# Patient Record
Sex: FEMALE | Race: BLACK OR AFRICAN AMERICAN | NOT HISPANIC OR LATINO | URBAN - METROPOLITAN AREA
[De-identification: names, ages, dates, MRNs, and addresses within clinical notes are randomized per-mention and may not be internally consistent; named-entity substitution may affect disease eponyms.]

---

## 2017-03-16 ENCOUNTER — EMERGENCY (EMERGENCY)
Facility: HOSPITAL | Age: 82
LOS: 1 days | Discharge: HOME | End: 2017-03-16
Attending: EMERGENCY MEDICINE | Admitting: EMERGENCY MEDICINE
Payer: COMMERCIAL

## 2017-03-16 VITALS
RESPIRATION RATE: 18 BRPM | HEART RATE: 61 BPM | SYSTOLIC BLOOD PRESSURE: 182 MMHG | DIASTOLIC BLOOD PRESSURE: 90 MMHG | TEMPERATURE: 98 F

## 2017-03-16 DIAGNOSIS — Z87.19 PERSONAL HISTORY OF OTHER DISEASES OF THE DIGESTIVE SYSTEM: Chronic | ICD-10-CM

## 2017-03-16 DIAGNOSIS — Z95.5 PRESENCE OF CORONARY ANGIOPLASTY IMPLANT AND GRAFT: ICD-10-CM

## 2017-03-16 DIAGNOSIS — I25.10 ATHEROSCLEROTIC HEART DISEASE OF NATIVE CORONARY ARTERY WITHOUT ANGINA PECTORIS: ICD-10-CM

## 2017-03-16 DIAGNOSIS — E78.00 PURE HYPERCHOLESTEROLEMIA, UNSPECIFIED: ICD-10-CM

## 2017-03-16 DIAGNOSIS — R05 COUGH: ICD-10-CM

## 2017-03-16 DIAGNOSIS — I10 ESSENTIAL (PRIMARY) HYPERTENSION: ICD-10-CM

## 2017-03-16 DIAGNOSIS — Z98.890 OTHER SPECIFIED POSTPROCEDURAL STATES: ICD-10-CM

## 2017-03-16 DIAGNOSIS — R07.89 OTHER CHEST PAIN: ICD-10-CM

## 2017-03-16 LAB
ALBUMIN SERPL ELPH-MCNC: 4.2 G/DL — SIGNIFICANT CHANGE UP (ref 3.3–5)
ALP SERPL-CCNC: 163 U/L — HIGH (ref 40–120)
ALT FLD-CCNC: 17 U/L RC — SIGNIFICANT CHANGE UP (ref 10–45)
ANION GAP SERPL CALC-SCNC: 18 MMOL/L — HIGH (ref 5–17)
AST SERPL-CCNC: 20 U/L — SIGNIFICANT CHANGE UP (ref 10–40)
BASOPHILS # BLD AUTO: 0 K/UL — SIGNIFICANT CHANGE UP (ref 0–0.2)
BASOPHILS NFR BLD AUTO: 0.1 % — SIGNIFICANT CHANGE UP (ref 0–2)
BILIRUB SERPL-MCNC: 0.5 MG/DL — SIGNIFICANT CHANGE UP (ref 0.2–1.2)
BUN SERPL-MCNC: 21 MG/DL — SIGNIFICANT CHANGE UP (ref 7–23)
CALCIUM SERPL-MCNC: 9.5 MG/DL — SIGNIFICANT CHANGE UP (ref 8.4–10.5)
CHLORIDE SERPL-SCNC: 100 MMOL/L — SIGNIFICANT CHANGE UP (ref 96–108)
CO2 SERPL-SCNC: 21 MMOL/L — LOW (ref 22–31)
CREAT SERPL-MCNC: 1.55 MG/DL — HIGH (ref 0.5–1.3)
EOSINOPHIL # BLD AUTO: 0.1 K/UL — SIGNIFICANT CHANGE UP (ref 0–0.5)
EOSINOPHIL NFR BLD AUTO: 2.2 % — SIGNIFICANT CHANGE UP (ref 0–6)
GLUCOSE SERPL-MCNC: 102 MG/DL — HIGH (ref 70–99)
HCT VFR BLD CALC: 43.5 % — SIGNIFICANT CHANGE UP (ref 34.5–45)
HGB BLD-MCNC: 15.2 G/DL — SIGNIFICANT CHANGE UP (ref 11.5–15.5)
LYMPHOCYTES # BLD AUTO: 1.4 K/UL — SIGNIFICANT CHANGE UP (ref 1–3.3)
LYMPHOCYTES # BLD AUTO: 27.8 % — SIGNIFICANT CHANGE UP (ref 13–44)
MCHC RBC-ENTMCNC: 29.9 PG — SIGNIFICANT CHANGE UP (ref 27–34)
MCHC RBC-ENTMCNC: 35 GM/DL — SIGNIFICANT CHANGE UP (ref 32–36)
MCV RBC AUTO: 85.4 FL — SIGNIFICANT CHANGE UP (ref 80–100)
MONOCYTES # BLD AUTO: 0.9 K/UL — SIGNIFICANT CHANGE UP (ref 0–0.9)
MONOCYTES NFR BLD AUTO: 17 % — HIGH (ref 2–14)
NEUTROPHILS # BLD AUTO: 2.7 K/UL — SIGNIFICANT CHANGE UP (ref 1.8–7.4)
NEUTROPHILS NFR BLD AUTO: 52.8 % — SIGNIFICANT CHANGE UP (ref 43–77)
PLATELET # BLD AUTO: 125 K/UL — LOW (ref 150–400)
POTASSIUM SERPL-MCNC: 4.3 MMOL/L — SIGNIFICANT CHANGE UP (ref 3.5–5.3)
POTASSIUM SERPL-SCNC: 4.3 MMOL/L — SIGNIFICANT CHANGE UP (ref 3.5–5.3)
PROT SERPL-MCNC: 7.8 G/DL — SIGNIFICANT CHANGE UP (ref 6–8.3)
RBC # BLD: 5.1 M/UL — SIGNIFICANT CHANGE UP (ref 3.8–5.2)
RBC # FLD: 14 % — SIGNIFICANT CHANGE UP (ref 10.3–14.5)
SODIUM SERPL-SCNC: 139 MMOL/L — SIGNIFICANT CHANGE UP (ref 135–145)
TROPONIN T SERPL-MCNC: <0.01 NG/ML — SIGNIFICANT CHANGE UP (ref 0–0.06)
WBC # BLD: 5.1 K/UL — SIGNIFICANT CHANGE UP (ref 3.8–10.5)
WBC # FLD AUTO: 5.1 K/UL — SIGNIFICANT CHANGE UP (ref 3.8–10.5)

## 2017-03-16 PROCEDURE — 93010 ELECTROCARDIOGRAM REPORT: CPT

## 2017-03-16 PROCEDURE — 71020: CPT | Mod: 26

## 2017-03-16 PROCEDURE — 99220: CPT | Mod: 25

## 2017-03-16 RX ORDER — ASPIRIN/CALCIUM CARB/MAGNESIUM 324 MG
81 TABLET ORAL DAILY
Qty: 0 | Refills: 0 | Status: DISCONTINUED | OUTPATIENT
Start: 2017-03-16 | End: 2017-03-20

## 2017-03-16 RX ORDER — ISOSORBIDE MONONITRATE 60 MG/1
30 TABLET, EXTENDED RELEASE ORAL DAILY
Qty: 0 | Refills: 0 | Status: DISCONTINUED | OUTPATIENT
Start: 2017-03-16 | End: 2017-03-20

## 2017-03-16 RX ORDER — ASPIRIN/CALCIUM CARB/MAGNESIUM 324 MG
325 TABLET ORAL ONCE
Qty: 0 | Refills: 0 | Status: COMPLETED | OUTPATIENT
Start: 2017-03-16 | End: 2017-03-16

## 2017-03-16 RX ORDER — FUROSEMIDE 40 MG
20 TABLET ORAL DAILY
Qty: 0 | Refills: 0 | Status: DISCONTINUED | OUTPATIENT
Start: 2017-03-16 | End: 2017-03-20

## 2017-03-16 RX ORDER — ATORVASTATIN CALCIUM 80 MG/1
80 TABLET, FILM COATED ORAL AT BEDTIME
Qty: 0 | Refills: 0 | Status: DISCONTINUED | OUTPATIENT
Start: 2017-03-16 | End: 2017-03-20

## 2017-03-16 RX ORDER — NIFEDIPINE 30 MG
90 TABLET, EXTENDED RELEASE 24 HR ORAL DAILY
Qty: 0 | Refills: 0 | Status: DISCONTINUED | OUTPATIENT
Start: 2017-03-16 | End: 2017-03-20

## 2017-03-16 RX ORDER — LOSARTAN POTASSIUM 100 MG/1
50 TABLET, FILM COATED ORAL DAILY
Qty: 0 | Refills: 0 | Status: DISCONTINUED | OUTPATIENT
Start: 2017-03-16 | End: 2017-03-20

## 2017-03-16 RX ORDER — SODIUM CHLORIDE 9 MG/ML
3 INJECTION INTRAMUSCULAR; INTRAVENOUS; SUBCUTANEOUS ONCE
Qty: 0 | Refills: 0 | Status: COMPLETED | OUTPATIENT
Start: 2017-03-16 | End: 2017-03-16

## 2017-03-16 RX ADMIN — SODIUM CHLORIDE 3 MILLILITER(S): 9 INJECTION INTRAMUSCULAR; INTRAVENOUS; SUBCUTANEOUS at 17:16

## 2017-03-16 RX ADMIN — Medication 325 MILLIGRAM(S): at 17:26

## 2017-03-16 NOTE — ED PROVIDER NOTE - NS ED MD SCRIBE ATTENDING SCRIBE SECTIONS
INTAKE ASSESSMENT/SCREENINGS/PHYSICAL EXAM/REVIEW OF SYSTEMS/VITAL SIGNS( Pullset)/HIV/HISTORY OF PRESENT ILLNESS/DISPOSITION/PAST MEDICAL/SURGICAL/SOCIAL HISTORY

## 2017-03-16 NOTE — ED CDU PROVIDER NOTE - DETAILS
CHEST PAIN  -[facility]  [facility]  -[facility] EVAL  -STRESS TEST  -CASE D/W ATTENDING MANAV

## 2017-03-16 NOTE — ED CDU PROVIDER NOTE - OBJECTIVE STATEMENT
83 year old female with PMHx of HTN, HLD, CAD s/p stent 2010 presents to ED c/o intermittent chest pain radiating to the left side of neck onset yesterday. Described as tightness. Occurs randomly even at rest. Worse with palpation. Admits to dry cough x 4 days. Denies fever/chills, SOB, heavy lifting, recent illness. F/u with cardiologist Dr. Forde on [facility]. Last stress test 1 year ago and normal. Will admit to CDU for tele and stress test.    Adv Care  PCP Zhane Greer

## 2017-03-16 NOTE — ED ADULT NURSE REASSESSMENT NOTE - NS ED NURSE REASSESS COMMENT FT1
pt arrived and oriented to CDU.  pt A&OX3.  VSS.  No acute distress. hx of stents in 2010. pt sinus adrienne in 50's on cardiac monitor.  pt reports having left sided intermediate CP since last night.  pt also reports palpations/dizziness/ left arm numbness and tingling with the CP.   pt denies any CP or other symptoms at this time.  pt pending CE#2 and EKG at 2300 followed by stress in am.  plan of care explained to patient and family.  Safety maintained.  Will cont to monitor.
Received pt from Gisela LICONA cdu, received pt alert and responsive, oriented x4, denies any respiratory distress, SOB, or difficulty breathing. Pt transferred to CDU for observation for chest pain , IV in place, patent and free of signs of infiltration, placed on continuos cardiac monitoring as ordered, Sinus Bradycardia HR in the 40's ,  pt denies chest pain or palpitations, V/S stable, pt afebrile, pt denies pain at this time. Pt educated on unit and unit rules, instructed patient to notify RN of any needed assistance, Pt verbalizes understanding, Call bell placed within reach. Safety maintained. Will continue to monitor.

## 2017-03-16 NOTE — ED CDU PROVIDER NOTE - PLAN OF CARE
1. Stay hydrated.  2. Continue Current Home Medications  3. Follow up with your PCP Dr. Greer and your Cardiologist Dr. Forde in 1-2 days (Bring printed results to your doctor visit).  4. Return if symptoms, worsen, fever, weakness, chest pain, difficulty breathing, dizziness and all other concerns. 1. Stay hydrated.  2. Continue Current Home Medications  3. Follow up with your PCP Dr. Greer and your Cardiologist Dr. Forde in 1-2 days and follow up elevated Creatinine level with your Doctor. (Bring printed results to your doctor visit).  4. Return if symptoms, worsen, fever, weakness, chest pain, difficulty breathing, dizziness and all other concerns. 1. Stay hydrated.  2. Continue Current Home Medications.  3. Follow up with your PCP Dr. Greer and your Cardiologist Dr. Forde in 1-2 days and follow up elevated Creatinine level with your Doctor. (Bring printed results to your doctor visit).  4. Return if symptoms worsen, fever, weakness, chest pain, difficulty breathing, dizziness and all other concerns.

## 2017-03-16 NOTE — ED ADULT NURSE NOTE - OBJECTIVE STATEMENT
83y female pt brought by daughter, hx of htn and stent on daily 81mg of Aspirin, arrived to ED c/o sternal chest pain radiating to left side of neck started yesterday. Pain in intermittent and no pain at rest currently, on CM with sinus adrienne and no sob, lungs are CTA. aox4, normally ambulating by herself without assist.

## 2017-03-16 NOTE — ED CDU PROVIDER NOTE - PROGRESS NOTE DETAILS
Spoke with Adv Care Dr. Levine. Will see patient in CDU in AM. - Giselle Reed PA-C CDU NOTE ATIF Hooker: pt resting comfortably, feels well without complaint. some neck discomfort. NAD VSS. no events on tele. ttp at L side paraspinal cervical. offerred pain medication- pt declined. offerred heat packs- pt accepted. CDU NOTE ATIF Hooker: pt asleep. NAD VSS. no events on tele. CDU NOTE ATIF Hooker: pt resting comfortably, feels well, neck pain improved with hot pack. requests nasal decongestant. NAD VSS. no events on tele. Flonase ordered. Patient resting in bed comfortably. NAD. C/o frontal HA. Also c/o chest soreness on palpation. No SOB. VSS. No events on telemetry monitor. Will give tylenol and re-assess. -Giselle Reed PA-C Patient resting in bed comfortably. NAD. No complaints. Vital Signs Stable. No events on telemetry monitor. Awaiting stress test results.  -Giselle Reed PA-C Patient resting in bed comfortably. NAD. No complaints. Vital Signs Stable. No events on telemetry monitor.  Normal stress test; unchanged from nuc stress in 2010. D/w Dr. Ware. -Giselle Reed PA-C ATTENDING MD Jeanie: Pt pain free and comfortable. Asymtomatic now. Eating comfortably. Stress test without acute findings. Repeat exam reassuring. Pt is stable for DC and will f/u with regular outpatient providers. All questions answered. Pt counselled on warning signs for which to return to the ED.

## 2017-03-16 NOTE — ED ADULT NURSE NOTE - PMH
Benign essential HTN    CAD (coronary artery disease)    Hypercholesteremia    Presence of stent in coronary artery

## 2017-03-16 NOTE — ED PROVIDER NOTE - DETAILS:
intermittent left sided chest pain to neck for days, worse today, clear lungs, bradycardic exam, soft abdomen

## 2017-03-16 NOTE — ED PROVIDER NOTE - OBJECTIVE STATEMENT
83 year old female with PMHx of cardiac stent and Hypertension  and presents to ED c/o chest pain radiating to the left side of neck onset yesterday. Pain is intermittent. F/u with cardiologist Dr. Forde on [facility].

## 2017-03-17 VITALS
TEMPERATURE: 98 F | HEART RATE: 57 BPM | OXYGEN SATURATION: 99 % | SYSTOLIC BLOOD PRESSURE: 112 MMHG | DIASTOLIC BLOOD PRESSURE: 73 MMHG | RESPIRATION RATE: 16 BRPM

## 2017-03-17 LAB — TROPONIN T SERPL-MCNC: <0.01 NG/ML — SIGNIFICANT CHANGE UP (ref 0–0.06)

## 2017-03-17 PROCEDURE — 71046 X-RAY EXAM CHEST 2 VIEWS: CPT

## 2017-03-17 PROCEDURE — 80053 COMPREHEN METABOLIC PANEL: CPT

## 2017-03-17 PROCEDURE — 84484 ASSAY OF TROPONIN QUANT: CPT

## 2017-03-17 PROCEDURE — A9505: CPT

## 2017-03-17 PROCEDURE — A9500: CPT

## 2017-03-17 PROCEDURE — 78452 HT MUSCLE IMAGE SPECT MULT: CPT | Mod: 26

## 2017-03-17 PROCEDURE — 99284 EMERGENCY DEPT VISIT MOD MDM: CPT | Mod: 25

## 2017-03-17 PROCEDURE — 93010 ELECTROCARDIOGRAM REPORT: CPT | Mod: 77

## 2017-03-17 PROCEDURE — 93016 CV STRESS TEST SUPVJ ONLY: CPT

## 2017-03-17 PROCEDURE — 93018 CV STRESS TEST I&R ONLY: CPT

## 2017-03-17 PROCEDURE — 93017 CV STRESS TEST TRACING ONLY: CPT

## 2017-03-17 PROCEDURE — 99217: CPT | Mod: 25

## 2017-03-17 PROCEDURE — 85027 COMPLETE CBC AUTOMATED: CPT

## 2017-03-17 PROCEDURE — G0378: CPT

## 2017-03-17 PROCEDURE — 78452 HT MUSCLE IMAGE SPECT MULT: CPT

## 2017-03-17 PROCEDURE — 93005 ELECTROCARDIOGRAM TRACING: CPT

## 2017-03-17 PROCEDURE — 94640 AIRWAY INHALATION TREATMENT: CPT

## 2017-03-17 RX ORDER — ACETAMINOPHEN 500 MG
650 TABLET ORAL ONCE
Qty: 0 | Refills: 0 | Status: COMPLETED | OUTPATIENT
Start: 2017-03-17 | End: 2017-03-17

## 2017-03-17 RX ORDER — FLUTICASONE PROPIONATE 50 MCG
1 SPRAY, SUSPENSION NASAL
Qty: 0 | Refills: 0 | Status: DISCONTINUED | OUTPATIENT
Start: 2017-03-17 | End: 2017-03-20

## 2017-03-17 RX ADMIN — Medication 650 MILLIGRAM(S): at 08:10

## 2017-03-17 RX ADMIN — Medication 1 SPRAY(S): at 05:31

## 2020-04-04 ENCOUNTER — INPATIENT (INPATIENT)
Facility: HOSPITAL | Age: 85
LOS: 3 days | Discharge: ANOTHER HOSPITAL | End: 2020-04-08
Attending: HOSPITALIST | Admitting: HOSPITALIST
Payer: COMMERCIAL

## 2020-04-04 VITALS
WEIGHT: 160.06 LBS | RESPIRATION RATE: 20 BRPM | OXYGEN SATURATION: 92 % | HEART RATE: 58 BPM | TEMPERATURE: 102 F | HEIGHT: 66 IN | SYSTOLIC BLOOD PRESSURE: 119 MMHG | DIASTOLIC BLOOD PRESSURE: 63 MMHG

## 2020-04-04 DIAGNOSIS — Z87.19 PERSONAL HISTORY OF OTHER DISEASES OF THE DIGESTIVE SYSTEM: Chronic | ICD-10-CM

## 2020-04-04 LAB
ALBUMIN SERPL ELPH-MCNC: 3.2 G/DL — LOW (ref 3.3–5)
ALP SERPL-CCNC: 91 U/L — SIGNIFICANT CHANGE UP (ref 40–120)
ALT FLD-CCNC: 25 U/L — SIGNIFICANT CHANGE UP (ref 12–78)
ANION GAP SERPL CALC-SCNC: 12 MMOL/L — SIGNIFICANT CHANGE UP (ref 5–17)
APPEARANCE UR: CLEAR — SIGNIFICANT CHANGE UP
APTT BLD: 31.6 SEC — SIGNIFICANT CHANGE UP (ref 28.5–37)
AST SERPL-CCNC: 53 U/L — HIGH (ref 15–37)
BASE EXCESS BLDA CALC-SCNC: 0.4 MMOL/L — SIGNIFICANT CHANGE UP (ref -2–2)
BASOPHILS # BLD AUTO: 0.02 K/UL — SIGNIFICANT CHANGE UP (ref 0–0.2)
BASOPHILS NFR BLD AUTO: 0.2 % — SIGNIFICANT CHANGE UP (ref 0–2)
BILIRUB SERPL-MCNC: 1 MG/DL — SIGNIFICANT CHANGE UP (ref 0.2–1.2)
BILIRUB UR-MCNC: NEGATIVE — SIGNIFICANT CHANGE UP
BLOOD GAS COMMENTS: SIGNIFICANT CHANGE UP
BLOOD GAS SOURCE: SIGNIFICANT CHANGE UP
BUN SERPL-MCNC: 41 MG/DL — HIGH (ref 7–23)
CALCIUM SERPL-MCNC: 9.1 MG/DL — SIGNIFICANT CHANGE UP (ref 8.5–10.1)
CHLORIDE SERPL-SCNC: 106 MMOL/L — SIGNIFICANT CHANGE UP (ref 96–108)
CO2 SERPL-SCNC: 25 MMOL/L — SIGNIFICANT CHANGE UP (ref 22–31)
COLOR SPEC: YELLOW — SIGNIFICANT CHANGE UP
CREAT SERPL-MCNC: 2.3 MG/DL — HIGH (ref 0.5–1.3)
DIFF PNL FLD: NEGATIVE — SIGNIFICANT CHANGE UP
EOSINOPHIL # BLD AUTO: 0 K/UL — SIGNIFICANT CHANGE UP (ref 0–0.5)
EOSINOPHIL NFR BLD AUTO: 0 % — SIGNIFICANT CHANGE UP (ref 0–6)
GLUCOSE SERPL-MCNC: 123 MG/DL — HIGH (ref 70–99)
GLUCOSE UR QL: NEGATIVE MG/DL — SIGNIFICANT CHANGE UP
HCO3 BLDA-SCNC: 22 MMOL/L — SIGNIFICANT CHANGE UP (ref 21–29)
HCT VFR BLD CALC: 45.9 % — HIGH (ref 34.5–45)
HGB BLD-MCNC: 15.5 G/DL — SIGNIFICANT CHANGE UP (ref 11.5–15.5)
HOROWITZ INDEX BLDA+IHG-RTO: 21 — SIGNIFICANT CHANGE UP
IMM GRANULOCYTES NFR BLD AUTO: 0.5 % — SIGNIFICANT CHANGE UP (ref 0–1.5)
INR BLD: 1.22 RATIO — HIGH (ref 0.88–1.16)
KETONES UR-MCNC: NEGATIVE — SIGNIFICANT CHANGE UP
LACTATE SERPL-SCNC: 2.3 MMOL/L — HIGH (ref 0.7–2)
LEUKOCYTE ESTERASE UR-ACNC: NEGATIVE — SIGNIFICANT CHANGE UP
LYMPHOCYTES # BLD AUTO: 0.77 K/UL — LOW (ref 1–3.3)
LYMPHOCYTES # BLD AUTO: 7.3 % — LOW (ref 13–44)
MCHC RBC-ENTMCNC: 26.5 PG — LOW (ref 27–34)
MCHC RBC-ENTMCNC: 33.8 GM/DL — SIGNIFICANT CHANGE UP (ref 32–36)
MCV RBC AUTO: 78.6 FL — LOW (ref 80–100)
MONOCYTES # BLD AUTO: 0.39 K/UL — SIGNIFICANT CHANGE UP (ref 0–0.9)
MONOCYTES NFR BLD AUTO: 3.7 % — SIGNIFICANT CHANGE UP (ref 2–14)
NEUTROPHILS # BLD AUTO: 9.31 K/UL — HIGH (ref 1.8–7.4)
NEUTROPHILS NFR BLD AUTO: 88.3 % — HIGH (ref 43–77)
NITRITE UR-MCNC: NEGATIVE — SIGNIFICANT CHANGE UP
NRBC # BLD: 0 /100 WBCS — SIGNIFICANT CHANGE UP (ref 0–0)
PCO2 BLDA: 28 MMHG — LOW (ref 32–46)
PH BLD: 7.51 — HIGH (ref 7.35–7.45)
PH UR: 5 — SIGNIFICANT CHANGE UP (ref 5–8)
PLATELET # BLD AUTO: 122 K/UL — LOW (ref 150–400)
PO2 BLDA: 74 MMHG — SIGNIFICANT CHANGE UP (ref 74–108)
POTASSIUM SERPL-MCNC: 3.9 MMOL/L — SIGNIFICANT CHANGE UP (ref 3.5–5.3)
POTASSIUM SERPL-SCNC: 3.9 MMOL/L — SIGNIFICANT CHANGE UP (ref 3.5–5.3)
PROT SERPL-MCNC: 8.7 GM/DL — HIGH (ref 6–8.3)
PROT UR-MCNC: 100 MG/DL
PROTHROM AB SERPL-ACNC: 13.8 SEC — HIGH (ref 10–12.9)
RBC # BLD: 5.84 M/UL — HIGH (ref 3.8–5.2)
RBC # FLD: 16.5 % — HIGH (ref 10.3–14.5)
SAO2 % BLDA: 95 % — SIGNIFICANT CHANGE UP (ref 92–96)
SODIUM SERPL-SCNC: 143 MMOL/L — SIGNIFICANT CHANGE UP (ref 135–145)
SP GR SPEC: 1.02 — SIGNIFICANT CHANGE UP (ref 1.01–1.02)
TROPONIN I SERPL-MCNC: 0.06 NG/ML — HIGH (ref 0.01–0.04)
UROBILINOGEN FLD QL: NEGATIVE MG/DL — SIGNIFICANT CHANGE UP
WBC # BLD: 10.54 K/UL — HIGH (ref 3.8–10.5)
WBC # FLD AUTO: 10.54 K/UL — HIGH (ref 3.8–10.5)

## 2020-04-04 PROCEDURE — 71045 X-RAY EXAM CHEST 1 VIEW: CPT | Mod: 26

## 2020-04-04 PROCEDURE — 93010 ELECTROCARDIOGRAM REPORT: CPT

## 2020-04-04 PROCEDURE — 99285 EMERGENCY DEPT VISIT HI MDM: CPT

## 2020-04-04 RX ORDER — SODIUM CHLORIDE 9 MG/ML
500 INJECTION INTRAMUSCULAR; INTRAVENOUS; SUBCUTANEOUS ONCE
Refills: 0 | Status: COMPLETED | OUTPATIENT
Start: 2020-04-04 | End: 2020-04-04

## 2020-04-04 RX ORDER — ACETAMINOPHEN 500 MG
975 TABLET ORAL ONCE
Refills: 0 | Status: COMPLETED | OUTPATIENT
Start: 2020-04-04 | End: 2020-04-04

## 2020-04-04 RX ADMIN — SODIUM CHLORIDE 500 MILLILITER(S): 9 INJECTION INTRAMUSCULAR; INTRAVENOUS; SUBCUTANEOUS at 22:35

## 2020-04-04 RX ADMIN — SODIUM CHLORIDE 500 MILLILITER(S): 9 INJECTION INTRAMUSCULAR; INTRAVENOUS; SUBCUTANEOUS at 23:35

## 2020-04-04 RX ADMIN — Medication 975 MILLIGRAM(S): at 21:04

## 2020-04-04 NOTE — ED ADULT NURSE NOTE - NSIMPLEMENTINTERV_GEN_ALL_ED
Implemented All Fall with Harm Risk Interventions:  [place] to call system. Call bell, personal items and telephone within reach. Instruct patient to call for assistance. Room bathroom lighting operational. Non-slip footwear when patient is off stretcher. Physically safe environment: no spills, clutter or unnecessary equipment. Stretcher in lowest position, wheels locked, appropriate side rails in place. Provide visual cue, wrist band, yellow gown, etc. Monitor gait and stability. Monitor for mental status changes and reorient to person, place, and time. Review medications for side effects contributing to fall risk. Reinforce activity limits and safety measures with patient and family. Provide visual clues: red socks.

## 2020-04-04 NOTE — ED ADULT TRIAGE NOTE - CHIEF COMPLAINT QUOTE
Pt has been experiencing a fever since Tuesday. Today was weak and incontinent which is unusual for her.

## 2020-04-04 NOTE — ED PROVIDER NOTE - NIH STROKE SCALE: 6A. MOTOR LEG, LEFT, QM
Spoke with pt regarding rescheduling appt. Pt stated that she received a letter in the mail that Dr. Xie won't be in clinic and she will no longer needs that appt on 3/10/20 because she is already scheduled with another physician on 3/27/20   (0) No drift; leg holds 30 degree position for full 5 secs

## 2020-04-04 NOTE — ED PROVIDER NOTE - CARE PLAN
Principal Discharge DX:	Weakness  Secondary Diagnosis:	Pneumonia  Secondary Diagnosis:	Elevated troponin

## 2020-04-04 NOTE — ED ADULT NURSE NOTE - OBJECTIVE STATEMENT
88 y/o female PMHx HTN, HLD presents to the ED with weakness, fever and chills patient walks at home independent assist, denies pain or SOB, denies cough

## 2020-04-04 NOTE — ED PROVIDER NOTE - OBJECTIVE STATEMENT
Pertinent PMH/PSH/FHx/SHx and Review of Systems contained within:    88yo F w PMH of HTN (on amlodipine, hydralazine, ASA, carvedilol, triamterene), HL (on Rosuvastatin)  presents to ED for eval of generalized weakness x4d.  Pt celebrated her birthday with household mem  in bed 2 days, feeling weak, decr PO intake since Tues/Wed  fever today Pertinent PMH/PSH/FHx/SHx and Review of Systems contained within:    88yo F w PMH of HTN (on amlodipine, hydralazine, ASA, carvedilol, triamterene), HL (on Rosuvastatin)  presents to ED for eval of generalized weakness x4d.  Pt celebrated her birthday with household members that day, since then she has been more weak.  Son Jose ([phone]) states pt has not left her bed for 2 days, states he has been making her food, but found today it was under her bed.  Today noted pt had fever & was in bed in her feces & urine.  Pt states she just feels weak, denies CP, SOB, abd pain, vomiting.    +fever, No photophobia/eye pain/changes in vision, No ear pain/sore throat/dysphagia, No chest pain/palpitations, no SOB/cough/wheeze/stridor, No abdominal pain, No neck/back pain, no rash

## 2020-04-04 NOTE — ED PROVIDER NOTE - PHYSICAL EXAMINATION
Gen: Awake, NAD, speaking in complete sentences  Head: NC, AT, EOMI, normal lids/conjunctiva  ENT: normal hearing, patent oropharynx  Neck: supple, no tenderness/meningismus, FROM, Trachea midline  Pulm: deferred, COVID PUI  CV: RRR, +dist pulses  Abd: soft, ND, no guarding/rebound tenderness  Mskel: no edema/erythema/cyanosis  Skin: no rash  Neuro: AAOx3, no sensory/motor deficits

## 2020-04-05 DIAGNOSIS — N17.9 ACUTE KIDNEY FAILURE, UNSPECIFIED: ICD-10-CM

## 2020-04-05 DIAGNOSIS — Z29.9 ENCOUNTER FOR PROPHYLACTIC MEASURES, UNSPECIFIED: ICD-10-CM

## 2020-04-05 DIAGNOSIS — J18.9 PNEUMONIA, UNSPECIFIED ORGANISM: ICD-10-CM

## 2020-04-05 DIAGNOSIS — R79.89 OTHER SPECIFIED ABNORMAL FINDINGS OF BLOOD CHEMISTRY: ICD-10-CM

## 2020-04-05 DIAGNOSIS — E78.5 HYPERLIPIDEMIA, UNSPECIFIED: ICD-10-CM

## 2020-04-05 DIAGNOSIS — I10 ESSENTIAL (PRIMARY) HYPERTENSION: ICD-10-CM

## 2020-04-05 LAB
ALBUMIN SERPL ELPH-MCNC: 2.6 G/DL — LOW (ref 3.3–5)
ALP SERPL-CCNC: 84 U/L — SIGNIFICANT CHANGE UP (ref 40–120)
ALT FLD-CCNC: 25 U/L — SIGNIFICANT CHANGE UP (ref 12–78)
ANION GAP SERPL CALC-SCNC: 12 MMOL/L — SIGNIFICANT CHANGE UP (ref 5–17)
AST SERPL-CCNC: 56 U/L — HIGH (ref 15–37)
BACTERIA # UR AUTO: ABNORMAL
BILIRUB SERPL-MCNC: 1 MG/DL — SIGNIFICANT CHANGE UP (ref 0.2–1.2)
BUN SERPL-MCNC: 43 MG/DL — HIGH (ref 7–23)
CALCIUM SERPL-MCNC: 8.5 MG/DL — SIGNIFICANT CHANGE UP (ref 8.5–10.1)
CHLORIDE SERPL-SCNC: 109 MMOL/L — HIGH (ref 96–108)
CO2 SERPL-SCNC: 23 MMOL/L — SIGNIFICANT CHANGE UP (ref 22–31)
COMMENT - URINE: SIGNIFICANT CHANGE UP
CREAT SERPL-MCNC: 2.55 MG/DL — HIGH (ref 0.5–1.3)
CRP SERPL-MCNC: 14.69 MG/DL — HIGH (ref 0–0.4)
EPI CELLS # UR: SIGNIFICANT CHANGE UP
FERRITIN SERPL-MCNC: 1038 NG/ML — HIGH (ref 15–150)
GLUCOSE SERPL-MCNC: 119 MG/DL — HIGH (ref 70–99)
LACTATE SERPL-SCNC: 1.8 MMOL/L — SIGNIFICANT CHANGE UP (ref 0.7–2)
LDH SERPL L TO P-CCNC: 467 U/L — HIGH (ref 50–242)
POTASSIUM SERPL-MCNC: 3.9 MMOL/L — SIGNIFICANT CHANGE UP (ref 3.5–5.3)
POTASSIUM SERPL-SCNC: 3.9 MMOL/L — SIGNIFICANT CHANGE UP (ref 3.5–5.3)
PROCALCITONIN SERPL-MCNC: 0.47 NG/ML — HIGH (ref 0.02–0.1)
PROT SERPL-MCNC: 8.3 GM/DL — SIGNIFICANT CHANGE UP (ref 6–8.3)
RBC CASTS # UR COMP ASSIST: NEGATIVE /HPF — SIGNIFICANT CHANGE UP (ref 0–4)
SARS-COV-2 RNA SPEC QL NAA+PROBE: SIGNIFICANT CHANGE UP
SODIUM SERPL-SCNC: 144 MMOL/L — SIGNIFICANT CHANGE UP (ref 135–145)
TROPONIN I SERPL-MCNC: 0.04 NG/ML — SIGNIFICANT CHANGE UP (ref 0.01–0.04)
TROPONIN I SERPL-MCNC: 0.05 NG/ML — HIGH (ref 0.01–0.04)
WBC UR QL: SIGNIFICANT CHANGE UP

## 2020-04-05 PROCEDURE — 99222 1ST HOSP IP/OBS MODERATE 55: CPT

## 2020-04-05 PROCEDURE — 99231 SBSQ HOSP IP/OBS SF/LOW 25: CPT

## 2020-04-05 PROCEDURE — 99223 1ST HOSP IP/OBS HIGH 75: CPT

## 2020-04-05 RX ORDER — CARVEDILOL PHOSPHATE 80 MG/1
6.25 CAPSULE, EXTENDED RELEASE ORAL EVERY 12 HOURS
Refills: 0 | Status: DISCONTINUED | OUTPATIENT
Start: 2020-04-05 | End: 2020-04-05

## 2020-04-05 RX ORDER — CEFTRIAXONE 500 MG/1
1000 INJECTION, POWDER, FOR SOLUTION INTRAMUSCULAR; INTRAVENOUS ONCE
Refills: 0 | Status: COMPLETED | OUTPATIENT
Start: 2020-04-05 | End: 2020-04-05

## 2020-04-05 RX ORDER — HYDROXYCHLOROQUINE SULFATE 200 MG
400 TABLET ORAL EVERY 12 HOURS
Refills: 0 | Status: COMPLETED | OUTPATIENT
Start: 2020-04-05 | End: 2020-04-05

## 2020-04-05 RX ORDER — ATORVASTATIN CALCIUM 80 MG/1
40 TABLET, FILM COATED ORAL AT BEDTIME
Refills: 0 | Status: DISCONTINUED | OUTPATIENT
Start: 2020-04-05 | End: 2020-04-08

## 2020-04-05 RX ORDER — AZITHROMYCIN 500 MG/1
500 TABLET, FILM COATED ORAL ONCE
Refills: 0 | Status: COMPLETED | OUTPATIENT
Start: 2020-04-05 | End: 2020-04-05

## 2020-04-05 RX ORDER — AMLODIPINE BESYLATE 2.5 MG/1
10 TABLET ORAL DAILY
Refills: 0 | Status: DISCONTINUED | OUTPATIENT
Start: 2020-04-05 | End: 2020-04-08

## 2020-04-05 RX ORDER — ACETAMINOPHEN 500 MG
650 TABLET ORAL EVERY 4 HOURS
Refills: 0 | Status: DISCONTINUED | OUTPATIENT
Start: 2020-04-05 | End: 2020-04-08

## 2020-04-05 RX ORDER — HEPARIN SODIUM 5000 [USP'U]/ML
5000 INJECTION INTRAVENOUS; SUBCUTANEOUS EVERY 12 HOURS
Refills: 0 | Status: DISCONTINUED | OUTPATIENT
Start: 2020-04-05 | End: 2020-04-08

## 2020-04-05 RX ORDER — SODIUM CHLORIDE 9 MG/ML
1000 INJECTION, SOLUTION INTRAVENOUS
Refills: 0 | Status: DISCONTINUED | OUTPATIENT
Start: 2020-04-05 | End: 2020-04-06

## 2020-04-05 RX ORDER — HYDROXYCHLOROQUINE SULFATE 200 MG
TABLET ORAL
Refills: 0 | Status: DISCONTINUED | OUTPATIENT
Start: 2020-04-05 | End: 2020-04-05

## 2020-04-05 RX ORDER — ASPIRIN/CALCIUM CARB/MAGNESIUM 324 MG
81 TABLET ORAL DAILY
Refills: 0 | Status: DISCONTINUED | OUTPATIENT
Start: 2020-04-05 | End: 2020-04-08

## 2020-04-05 RX ADMIN — Medication 400 MILLIGRAM(S): at 10:07

## 2020-04-05 RX ADMIN — ATORVASTATIN CALCIUM 40 MILLIGRAM(S): 80 TABLET, FILM COATED ORAL at 23:09

## 2020-04-05 RX ADMIN — AZITHROMYCIN 500 MILLIGRAM(S): 500 TABLET, FILM COATED ORAL at 00:37

## 2020-04-05 RX ADMIN — Medication 400 MILLIGRAM(S): at 02:09

## 2020-04-05 RX ADMIN — HEPARIN SODIUM 5000 UNIT(S): 5000 INJECTION INTRAVENOUS; SUBCUTANEOUS at 16:12

## 2020-04-05 RX ADMIN — HEPARIN SODIUM 5000 UNIT(S): 5000 INJECTION INTRAVENOUS; SUBCUTANEOUS at 06:15

## 2020-04-05 RX ADMIN — Medication 650 MILLIGRAM(S): at 16:07

## 2020-04-05 RX ADMIN — SODIUM CHLORIDE 60 MILLILITER(S): 9 INJECTION, SOLUTION INTRAVENOUS at 20:21

## 2020-04-05 RX ADMIN — CEFTRIAXONE 100 MILLIGRAM(S): 500 INJECTION, POWDER, FOR SOLUTION INTRAMUSCULAR; INTRAVENOUS at 00:37

## 2020-04-05 RX ADMIN — Medication 81 MILLIGRAM(S): at 10:07

## 2020-04-05 NOTE — H&P ADULT - NSHPREVIEWOFSYSTEMS_GEN_ALL_CORE
Constitutional: fever, chills  Ears: no hearing changes or ear pain,   Nose: no nasal congestion, sinus pain, or rhinorrhea  Cardio: no chest pain, orthopnea, edema, or palpitations  Resp: dyspnea, cough   GI: no nausea, vomiting, diarrhea, constipation, hematochezia, or melena  : no dysuria, urinary frequency, hematuria  MSK: no back pain, neck pain  Skin: no rash, pruritis   Neuro: weakness +ve No dizziness, lightheadedness, syncope   Heme/Lymph: no bruising or bleeding

## 2020-04-05 NOTE — CONSULT NOTE ADULT - ASSESSMENT
FTT, CKD by history, urinalysis is bland, may have UTI, may not have Covid 19 yet.  Needs IVF, urine culture, renal ultrasound, monitor labs.

## 2020-04-05 NOTE — H&P ADULT - HISTORY OF PRESENT ILLNESS
Patient is an 86 YO F with a PMH of HTN and HLD who presents to the ED for weakness and lethargy.  Patient admitted for evaluation for COVID19.  Has had symptoms for 4 days.  Symptoms started after her family had a party for her 87th birthday.  Patient reports hx of weakness, lethargy, and worsening dyspnea.  At baseline, patient ambulates without assistance.  Patient reportedly has not gotten out of bed in 2 days and is now incontinent.    Denies hx of fever, chills, cough.  Reports two days of nonbloody diarrhea as well.   No prior testing for COVID19, denies recent exposure to anyone with known COVID19.  Nonsmoker, NKDA.  95%on 5L O2 via NC.  Febrile to 101.7 in ED.   Labs show mild leukocytosis, increased Cr, lactic acidosis, and mild tropinemia.  CXR shows RLL opacities.  Will admit patient to floor for PNA.

## 2020-04-05 NOTE — H&P ADULT - PROBLEM/PLAN-5
Prescription refilled. Concepcion Bocanegra MD   
Requested Prescriptions   Pending Prescriptions Disp Refills     gabapentin (NEURONTIN) 300 MG capsule [Pharmacy Med Name: GABAPENTIN 300MG CAPSULES]  Last Written Prescription Date:  12/29/17  Last Fill Quantity: 90,  # refills: 0   Last Office Visit with G, P or [facility] prescribing provider:  02/05/18   Future Office Visit:    90 capsule 0     Sig: SEE NOTES    There is no refill protocol information for this order          
Routing refill request to provider for review/approval because:  Drug not on the [facility] refill protocol   Sasha Hunter RN        
DISPLAY PLAN FREE TEXT

## 2020-04-05 NOTE — H&P ADULT - ASSESSMENT
Patient is an 86 YO F with a PMH of HTN and HLD who presents to the ED for weakness and lethargy.  Patient admitted for evaluation for COVID19.  Has had symptoms for 4 days.  Symptoms started after her family had a party for her 87th birthday.  Patient reports hx of weakness, lethargy, and worsening dyspnea.  At baseline, patient ambulates without assistance.  Patient reportedly has not gotten out of bed in 2 days and is now incontinent.    Denies hx of fever, chills, cough.  Reports two days of nonbloody diarrhea as well.   No prior testing for COVID19, denies recent exposure to anyone with known COVID19.  Nonsmoker, NKDA.  95%on 5L O2 via NC.  Febrile to 101.7 in ED.   Labs show mild leukocytosis, increased Cr, lactic acidosis, and mild tropinemia.  CXR shows RLL opacities.  Will admit patient to floor for PNA.      IMPROVE VTE Individual Risk Assessment          RISK                                                          Points  [  ] Previous VTE                                                3  [  ] Thrombophilia                                             2  [  ] Lower limb paralysis                                    2        (unable to hold up >15 seconds)    [  ] Current Cancer                                             2         (within 6 months)  [  ] Immobilization > 24 hrs                              1  [  ] ICU/CCU stay > 24 hours                            1  [  ] Age > 60                                                    1    IMPROVE VTE Score - 1

## 2020-04-05 NOTE — CHART NOTE - NSCHARTNOTEFT_GEN_A_CORE
86 YO F with a PMH of HTN and HLD who presents to the ED for weakness and lethargy.  Patient admitted for evaluation for COVID19.  Has had symptoms for 4 days  Patient seen and examined.   Reviewed H and P, labs and imaging.     Acute febrile illness, COVID neg, UTI, follow up blood cultures, renal ultrasound.   Renal consult appreciated. Bladder sono.

## 2020-04-05 NOTE — ED ADULT NURSE REASSESSMENT NOTE - NS ED NURSE REASSESS COMMENT FT1
Pt resting comfortably, no distress noted, resp even and unlabored, pt updated about the plan of care, pt states no pain, no discomfort and does not appear to be in any distress. Pt proficient in the plan of care as evidenced by successful patient teach back.
Safety checks compld + maintd. T+P Q encouraged. IV sites checked Q2+remains WDL. meds given as ord with no s/s of adverse RXNs. skin care and complete care rendered. Fall +skin precs in place. Call bell within reach and safety measures in place.
straight cath performed urine sent
Safety checks compld + maintd. T+P Q encouraged. IV sites checked Q2+remains WDL. meds given as ord with no s/s of adverse RXNs. skin care and complete care rendered. Fall +skin precs in place. Call bell within reach and safety measures in place.

## 2020-04-05 NOTE — H&P ADULT - NSHPLABSRESULTS_GEN_ALL_CORE
Recent Vitals  T(C): 37.3 (20 @ 22:35), Max: 38.7 (20 @ 19:33)  HR: 55 (20 @ 22:35) (55 - 58)  BP: 108/52 (20 @ 22:35) (108/52 - 119/63)  RR: 20 (20 @ 22:35) (20 - 20)  SpO2: 96% (20 @ 22:35) (92% - 96%)                        15.5   10.54 )-----------( 122      ( 2020 20:57 )             45.9     -    143  |  106  |  41<H>  ----------------------------<  123<H>  3.9   |  25  |  2.30<H>    Ca    9.1      2020 20:57    TPro  8.7<H>  /  Alb  3.2<L>  /  TBili  1.0  /  DBili  x   /  AST  53<H>  /  ALT  25  /  AlkPhos  91  04-04    PT/INR - ( 2020 20:57 )   PT: 13.8 sec;   INR: 1.22 ratio         PTT - ( 2020 20:57 )  PTT:31.6 sec  LIVER FUNCTIONS - ( 2020 20:57 )  Alb: 3.2 g/dL / Pro: 8.7 gm/dL / ALK PHOS: 91 U/L / ALT: 25 U/L / AST: 53 U/L / GGT: x           Urinalysis Basic - ( 2020 23:48 )    Color: Yellow / Appearance: Clear / S.020 / pH: x  Gluc: x / Ketone: Negative  / Bili: Negative / Urobili: Negative mg/dL   Blood: x / Protein: 100 mg/dL / Nitrite: Negative   Leuk Esterase: Negative / RBC: Negative /HPF / WBC 0-2   Sq Epi: x / Non Sq Epi: Occasional / Bacteria: Moderate        Home Medications:

## 2020-04-05 NOTE — CONSULT NOTE ADULT - SUBJECTIVE AND OBJECTIVE BOX
"Patient is an 88 YO F with a PMH of HTN and HLD who presents to the ED for weakness and lethargy.  Patient admitted for evaluation for COVID19.  Has had symptoms for 4 days.  Symptoms started after her family had a party for her 87th birthday.  Patient reports hx of weakness, lethargy, and worsening dyspnea.  At baseline, patient ambulates without assistance.  Patient reportedly has not gotten out of bed in 2 days and is now incontinent.    Denies hx of fever, chills, cough.  Reports two days of nonbloody diarrhea as well.   No prior testing for COVID19, denies recent exposure to anyone with known COVID19.  Nonsmoker, NKDA.  95%on 5L O2 via NC.  Febrile to 101.7 in ED.   Labs show mild leukocytosis, increased Cr, lactic acidosis, and mild tropinemia.  CXR shows RLL opacities.  Will admit patient to floor for PNA."    The patient reports known to her chronic renal disease and prior visits with nephrologist in [facility] "a long time ago". No NSAIDS, no voiding c/o.      PAST MEDICAL & SURGICAL HISTORY:  Presence of stent in coronary artery  CAD (coronary artery disease)  Hypercholesteremia  Benign essential HTN  H/O small bowel obstruction    FAMILY HISTORY:  Family history of hypertension in brother (Sibling)    No Known Allergies    Home meds: amlodipine, hydralazine, ASA, carvedilol, triamterene, Rosuvastatin    MEDICATIONS  (STANDING):  amLODIPine   Tablet 10 milliGRAM(s) Oral daily  aspirin enteric coated 81 milliGRAM(s) Oral daily  atorvastatin 40 milliGRAM(s) Oral at bedtime  heparin  Injectable 5000 Unit(s) SubCutaneous every 12 hours  hydroxychloroquine   Oral     MEDICATIONS  (PRN):  acetaminophen   Tablet .. 650 milliGRAM(s) Oral every 4 hours PRN Temp greater or equal to 38.5C (101.3F)    Vital Signs Last 24 Hrs  T(C): 37.6 (2020 18:37), Max: 38.7 (2020 19:33)  T(F): 99.6 (2020 18:37), Max: 101.7 (2020 19:33)  HR: 49 (2020 18:37) (45 - 58)  BP: 126/63 (2020 18:37) (105/58 - 126/63)  BP(mean): --  RR: 20 (2020 18:37) (20 - 21)  SpO2: 94% (2020 18:37) (92% - 97%)    PHYSICAL EXAM:  General: NAD, on stretcher in E.R., alert and conversant  No JVD  Respiratory: b/l air entry  Cardiovascular: S1 S2 reg  Gastrointestinal: soft, NT, no bladder distension  Extremities: no edema        144  |  109<H>  |  43<H>  ----------------------------<  119<H>  3.9   |  23  |  2.55<H>    Ca    8.5      2020 02:13    TPro  8.3  /  Alb  2.6<L>  /  TBili  1.0  /  DBili  x   /  AST  56<H>  /  ALT  25  /  AlkPhos  84      Blood Urea Nitrogen, Serum: 43 mg/dL ( @ 02:13)  Potassium, Serum: 3.9 mmol/L ( @ 02:13)  Calcium, Total Serum: 8.5 mg/dL ( @ 02:13)  Hemoglobin: 15.5 g/dL ( @ 20:57)  Hematocrit: 45.9 % ( @ 20:57)  Potassium, Serum: 3.9 mmol/L ( @ 20:57)  Calcium, Total Serum: 9.1 mg/dL ( @ 20:57)  Blood Urea Nitrogen, Serum: 41 mg/dL ( @ 20:57)      Creatinine, Serum: 2.55 ( @ 02:13)  Creatinine, Serum: 2.30 ( @ 20:57)    CBC Full  -  ( 2020 20:57 )  WBC Count : 10.54 K/uL  RBC Count : 5.84 M/uL  Hemoglobin : 15.5 g/dL  Hematocrit : 45.9 %  Platelet Count - Automated : 122 K/uL  Mean Cell Volume : 78.6 fl  Mean Cell Hemoglobin : 26.5 pg  Mean Cell Hemoglobin Concentration : 33.8 gm/dL  Auto Neutrophil # : 9.31 K/uL  Auto Lymphocyte # : 0.77 K/uL  Auto Monocyte # : 0.39 K/uL  Auto Eosinophil # : 0.00 K/uL  Auto Basophil # : 0.02 K/uL  Auto Neutrophil % : 88.3 %  Auto Lymphocyte % : 7.3 %  Auto Monocyte % : 3.7 %  Auto Eosinophil % : 0.0 %  Auto Basophil % : 0.2 %    Urinalysis Basic - ( 2020 23:48 )    Color: Yellow / Appearance: Clear / S.020 / pH: x  Gluc: x / Ketone: Negative  / Bili: Negative / Urobili: Negative mg/dL   Blood: x / Protein: 100 mg/dL / Nitrite: Negative   Leuk Esterase: Negative / RBC: Negative /HPF / WBC 0-2   Sq Epi: x / Non Sq Epi: Occasional / Bacteria: Moderate      ABG - ( 2020 22:31 )  pH, Arterial: x     pH, Blood: 7.51  /  pCO2: 28    /  pO2: 74    / HCO3: 22    / Base Excess: 0.4   /  SaO2: 95

## 2020-04-05 NOTE — H&P ADULT - PROBLEM SELECTOR PLAN 1
COVID 19 swabbed in ED.  Flu/RSV negative.  RVP pending.  Isolation precautions  EKG to check QTc, Follow official CXR, Blood Cx   Tylenol PRN fever  Start Plaquenil, [place], Solumedrol

## 2020-04-05 NOTE — CONSULT NOTE ADULT - ASSESSMENT
87y Female with a PMH of HTN and HLD.    She presented to the ED with c/o acute onset weakness, lethargy x 4 days, two days of bloody diarrhea, one day of acute dyspnea.  Symptoms reportedly started after her family had a party for her 87th birthday.  Patient reportedly has not gotten out of bed in 2 days and is now incontinent.    Denies hx of fever, chills, cough.  No prior testing for COVID19, denies recent exposure to anyone with known COVID19.  Nonsmoker.  95%on 5L O2 via NC.  Febrile to 101.7 in ED.   Labs show mild leukocytosis, increased Cr, lactic acidosis.    Cardiac consultation requested for presence of mild elevated cardiac troponins.      Impression:  ·	Acute febrile illness, presumed COVID-19 infection.  ·	Acute (?) renal insufficiency  ·	Minimally elevated cardiac enzymes, no ECG evidence of acute cardiac event. Likely combination of rebal insufficiency and demand ischemia.  ·	Hypertension  ·	Hyperlipidemia    Suggest:  No evidence of acute coronary event clinically, mild enzyme elevation likely from other causes.  Being treated for presumed COVID.  No indication for further diagnostic testing at present, when COVID negative will consider further risk stratification as needed.  As baseline QT<500 msec, does not require further telemetry monitoring.  Continue low dose ASA, bbl, CACB, heparin for DVT prophylaxis. 87y Female with a PMH of HTN and HLD.    She presented to the ED with c/o acute onset weakness, lethargy x 4 days, two days of bloody diarrhea, one day of acute dyspnea.  Symptoms reportedly started after her family had a party for her 87th birthday.  Patient reportedly has not gotten out of bed in 2 days and is now incontinent.    Denies hx of fever, chills, cough.  No prior testing for COVID19, denies recent exposure to anyone with known COVID19.  Nonsmoker.  95%on 5L O2 via NC.  Febrile to 101.7 in ED.   Labs show mild leukocytosis, increased Cr, lactic acidosis.    Cardiac consultation requested for presence of mild elevated cardiac troponins.      Impression:  ·	Acute febrile illness, presumed COVID-19 infection.  ·	Acute (?) renal insufficiency  ·	Minimally elevated cardiac enzymes, no ECG evidence of acute cardiac event. Likely combination of renal insufficiency and demand ischemia.  ·	Hypertension  ·	Hyperlipidemia    Suggest:  No evidence of acute coronary event clinically, mild enzyme elevation likely from other causes.  Being treated for presumed COVID.  No indication for further diagnostic testing at present, when COVID negative will consider further risk stratification as needed.  As baseline QT<500 msec, does not require further telemetry monitoring.  Continue low dose ASA, bbl, CACB, heparin for DVT prophylaxis.  Please reconsult if needed.

## 2020-04-05 NOTE — H&P ADULT - NSHPPHYSICALEXAM_GEN_ALL_CORE
Physical exam:  General: patient in no acute distress, resting comfortably  Head:  Atraumatic, Normocephalic  Eyes: EOMI, PERRLA, clear sclera  Neck: Supple, thyroid nontender, non enlarged  Cardio: mild rales   Resp: clear to ausculation bilaterally, no rales or wheezes  GI: abdomen soft, nontender, non distended, no guarding, BS +ve x 4  Ext: no significant pedal edema  Neuro: CN 2-12 intact, no significant motor or sensory deficits.  Skin: No rashes or lesions

## 2020-04-05 NOTE — CONSULT NOTE ADULT - SUBJECTIVE AND OBJECTIVE BOX
CARDIOLOGY CONSULTATION NOTE                                                                               ELVIRA PIEDRA is an 87y Female with a PMH of HTN and HLD.    She presented to the ED with c/o acute onset weakness, lethargy x 4 days, two days of bloody diarrhea, one day of acute dyspnea.  Symptoms reportedly started after her family had a party for her 87th birthday.  Patient reportedly has not gotten out of bed in 2 days and is now incontinent.    Denies hx of fever, chills, cough.  No prior testing for COVID19, denies recent exposure to anyone with known COVID19.  Nonsmoker.  95%on 5L O2 via NC.  Febrile to 101.7 in ED.   Labs show mild leukocytosis, increased Cr, lactic acidosis.    Cardiac consultation requested for presence of mild elevated cardiac troponins.      REVIEW OF SYSTEMS: NA  -----------------------------    Home Medications:      MEDICATIONS  (STANDING):  amLODIPine   Tablet 10 milliGRAM(s) Oral daily  aspirin enteric coated 81 milliGRAM(s) Oral daily  atorvastatin 40 milliGRAM(s) Oral at bedtime  carvedilol 6.25 milliGRAM(s) Oral every 12 hours  heparin  Injectable 5000 Unit(s) SubCutaneous every 12 hours  hydroxychloroquine   Oral   hydroxychloroquine 400 milliGRAM(s) Oral every 12 hours      ALLERGIES: No Known Allergies      FAMILY HISTORY: NC      PHYSICAL EXAMINATION:  -----------------------------  T(C): 36.6 (04-05-20 @ 08:38), Max: 38.7 (04-04-20 @ 19:33)  HR: 47 (04-05-20 @ 08:38) (45 - 58)  BP: 110/57 (04-05-20 @ 08:38) (108/52 - 119/63)  RR: 20 (04-05-20 @ 08:38) (20 - 21)  SpO2: 95% (04-05-20 @ 08:38) (92% - 97%)  Wt(kg): --    Height (cm): 167.64 (04-04 @ 19:33)  Weight (kg): [id number] (04-04 @ 19:33)  BMI (kg/m2): 25.8 (04-04 @ 19:33)  BSA (m2): 1.82 (04-04 @ 19:33)    Physical Exam: (Physical exam from H&P)  General: patient in no acute distress, resting comfortably  Head:  Atraumatic, Normocephalic  Eyes: EOMI, PERRLA, clear sclera  Neck: Supple, thyroid nontender, non enlarged  Cardio: mild rales   Resp: clear to ausculation bilaterally, no rales or wheezes  GI: abdomen soft, nontender, non distended, no guarding, BS +ve x 4  Ext: no significant pedal edema  Neuro: CN 2-12 intact, no significant motor or sensory deficits.   Skin: No rashes or lesions	  	      ECG:  -------  < from: 12 Lead ECG (04.04.20 @ 21:15) >    Ventricular Rate 67 BPM    Atrial Rate 67 BPM    P-R Interval 142 ms    QRS Duration 86 ms    Q-T Interval 428 ms    QTC Calculation(Bezet) 452 ms    P Axis 64 degrees    R Axis 23 degrees    T Axis 47 degrees    Diagnosis:  Sinus rhythm with premature atrial complexes  Nonspecific ST abnormality    No previous ECGs available  Confirmed by Wilmer Maddox MD ([id number]) on 4/5/2020 8:47:57 AM    < end of copied text >        LABS:   --------  04-05    144  |  109<H>  |  43<H>  ----------------------------<  119<H>  3.9   |  23  |  2.55<H>    Ca    8.5      05 Apr 2020 02:13    TPro  8.3  /  Alb  2.6<L>  /  TBili  1.0  /  DBili  x   /  AST  56<H>  /  ALT  25  /  AlkPhos  84  04-05                         15.5   10.54 )-----------( 122      ( 04 Apr 2020 20:57 )             45.9     PT/INR - ( 04 Apr 2020 20:57 )   PT: 13.8 sec;   INR: 1.22 ratio         PTT - ( 04 Apr 2020 20:57 )  PTT:31.6 sec    04-05 @ 06:14 CPK total:--, CKMB --, Troponin I - .036 ng/mL  04-05 @ 02:13 CPK total:--, CKMB --, Troponin I - .053 ng/mL<H>  04-04 @ 20:57 CPK total:--, CKMB --, Troponin I - .058 ng/mL<H>          RADIOLOGY REPORTS:  -----------------------------  < from: Xray Chest 1 View- PORTABLE-Urgent (04.04.20 @ 21:37) >    EXAM:  XR CHEST PORTABLE URGENT 1V                            PROCEDURE DATE:  04/04/2020          INTERPRETATION:  PORTABLE CHEST X-RAY    HISTORY: 87 years old. Female. weakness.    COMPARISON: None available.    FINDINGS:  There are questionableminimal right lower lung opacities.  No pneumothorax or pleural effusion.   The cardiac silhouette is within normal limits in size.    IMPRESSION:  Questionable minimal right lower lung opacities.      LUDY ROJO MD., ATTENDING RADIOLOGIST  This document has been electronically signed. Apr 4 2020 11:26PM                < end of copied text >

## 2020-04-06 LAB
ALBUMIN SERPL ELPH-MCNC: 2.5 G/DL — LOW (ref 3.3–5)
ALP SERPL-CCNC: 84 U/L — SIGNIFICANT CHANGE UP (ref 40–120)
ALT FLD-CCNC: 31 U/L — SIGNIFICANT CHANGE UP (ref 12–78)
ANION GAP SERPL CALC-SCNC: 11 MMOL/L — SIGNIFICANT CHANGE UP (ref 5–17)
AST SERPL-CCNC: 103 U/L — HIGH (ref 15–37)
BILIRUB SERPL-MCNC: 1 MG/DL — SIGNIFICANT CHANGE UP (ref 0.2–1.2)
BUN SERPL-MCNC: 54 MG/DL — HIGH (ref 7–23)
CALCIUM SERPL-MCNC: 9 MG/DL — SIGNIFICANT CHANGE UP (ref 8.5–10.1)
CHLORIDE SERPL-SCNC: 109 MMOL/L — HIGH (ref 96–108)
CK SERPL-CCNC: 2732 U/L — HIGH (ref 26–192)
CO2 SERPL-SCNC: 23 MMOL/L — SIGNIFICANT CHANGE UP (ref 22–31)
CREAT SERPL-MCNC: 2.11 MG/DL — HIGH (ref 0.5–1.3)
CRP SERPL-MCNC: 21.45 MG/DL — HIGH (ref 0–0.4)
CULTURE RESULTS: SIGNIFICANT CHANGE UP
GLUCOSE SERPL-MCNC: 97 MG/DL — SIGNIFICANT CHANGE UP (ref 70–99)
HCT VFR BLD CALC: 43.3 % — SIGNIFICANT CHANGE UP (ref 34.5–45)
HGB BLD-MCNC: 14.4 G/DL — SIGNIFICANT CHANGE UP (ref 11.5–15.5)
LDH SERPL L TO P-CCNC: 484 U/L — HIGH (ref 50–242)
MAGNESIUM SERPL-MCNC: 2.6 MG/DL — SIGNIFICANT CHANGE UP (ref 1.6–2.6)
MCHC RBC-ENTMCNC: 26.5 PG — LOW (ref 27–34)
MCHC RBC-ENTMCNC: 33.3 GM/DL — SIGNIFICANT CHANGE UP (ref 32–36)
MCV RBC AUTO: 79.7 FL — LOW (ref 80–100)
NRBC # BLD: 0 /100 WBCS — SIGNIFICANT CHANGE UP (ref 0–0)
PHOSPHATE SERPL-MCNC: 3.3 MG/DL — SIGNIFICANT CHANGE UP (ref 2.5–4.5)
PLATELET # BLD AUTO: 132 K/UL — LOW (ref 150–400)
POTASSIUM SERPL-MCNC: 3.5 MMOL/L — SIGNIFICANT CHANGE UP (ref 3.5–5.3)
POTASSIUM SERPL-SCNC: 3.5 MMOL/L — SIGNIFICANT CHANGE UP (ref 3.5–5.3)
PROT SERPL-MCNC: 7.9 GM/DL — SIGNIFICANT CHANGE UP (ref 6–8.3)
RBC # BLD: 5.43 M/UL — HIGH (ref 3.8–5.2)
RBC # FLD: 16.4 % — HIGH (ref 10.3–14.5)
SODIUM SERPL-SCNC: 143 MMOL/L — SIGNIFICANT CHANGE UP (ref 135–145)
SPECIMEN SOURCE: SIGNIFICANT CHANGE UP
WBC # BLD: 10.63 K/UL — HIGH (ref 3.8–10.5)
WBC # FLD AUTO: 10.63 K/UL — HIGH (ref 3.8–10.5)

## 2020-04-06 PROCEDURE — 71045 X-RAY EXAM CHEST 1 VIEW: CPT | Mod: 26

## 2020-04-06 PROCEDURE — 99232 SBSQ HOSP IP/OBS MODERATE 35: CPT

## 2020-04-06 RX ORDER — HYDROXYCHLOROQUINE SULFATE 200 MG
200 TABLET ORAL EVERY 12 HOURS
Refills: 0 | Status: DISCONTINUED | OUTPATIENT
Start: 2020-04-07 | End: 2020-04-08

## 2020-04-06 RX ORDER — AZITHROMYCIN 500 MG/1
500 TABLET, FILM COATED ORAL EVERY 24 HOURS
Refills: 0 | Status: DISCONTINUED | OUTPATIENT
Start: 2020-04-06 | End: 2020-04-06

## 2020-04-06 RX ORDER — HYDROXYCHLOROQUINE SULFATE 200 MG
TABLET ORAL
Refills: 0 | Status: DISCONTINUED | OUTPATIENT
Start: 2020-04-06 | End: 2020-04-08

## 2020-04-06 RX ORDER — AZITHROMYCIN 500 MG/1
500 TABLET, FILM COATED ORAL EVERY 24 HOURS
Refills: 0 | Status: DISCONTINUED | OUTPATIENT
Start: 2020-04-07 | End: 2020-04-07

## 2020-04-06 RX ORDER — HYDROXYCHLOROQUINE SULFATE 200 MG
400 TABLET ORAL EVERY 12 HOURS
Refills: 0 | Status: COMPLETED | OUTPATIENT
Start: 2020-04-06 | End: 2020-04-07

## 2020-04-06 RX ORDER — HYDROXYCHLOROQUINE SULFATE 200 MG
TABLET ORAL
Refills: 0 | Status: DISCONTINUED | OUTPATIENT
Start: 2020-04-06 | End: 2020-04-06

## 2020-04-06 RX ORDER — CEFTRIAXONE 500 MG/1
1000 INJECTION, POWDER, FOR SOLUTION INTRAMUSCULAR; INTRAVENOUS EVERY 24 HOURS
Refills: 0 | Status: DISCONTINUED | OUTPATIENT
Start: 2020-04-06 | End: 2020-04-08

## 2020-04-06 RX ADMIN — Medication 400 MILLIGRAM(S): at 17:05

## 2020-04-06 RX ADMIN — AMLODIPINE BESYLATE 10 MILLIGRAM(S): 2.5 TABLET ORAL at 04:51

## 2020-04-06 RX ADMIN — ATORVASTATIN CALCIUM 40 MILLIGRAM(S): 80 TABLET, FILM COATED ORAL at 21:33

## 2020-04-06 RX ADMIN — SODIUM CHLORIDE 60 MILLILITER(S): 9 INJECTION, SOLUTION INTRAVENOUS at 10:54

## 2020-04-06 RX ADMIN — CEFTRIAXONE 100 MILLIGRAM(S): 500 INJECTION, POWDER, FOR SOLUTION INTRAMUSCULAR; INTRAVENOUS at 10:53

## 2020-04-06 RX ADMIN — HEPARIN SODIUM 5000 UNIT(S): 5000 INJECTION INTRAVENOUS; SUBCUTANEOUS at 17:07

## 2020-04-06 RX ADMIN — Medication 81 MILLIGRAM(S): at 11:00

## 2020-04-06 RX ADMIN — HEPARIN SODIUM 5000 UNIT(S): 5000 INJECTION INTRAVENOUS; SUBCUTANEOUS at 04:55

## 2020-04-06 RX ADMIN — AZITHROMYCIN 255 MILLIGRAM(S): 500 TABLET, FILM COATED ORAL at 10:54

## 2020-04-06 NOTE — PROGRESS NOTE ADULT - SUBJECTIVE AND OBJECTIVE BOX
Patient is a 87y old  Female who presents with a chief complaint of ams (07 Apr 2020 20:11)      SUBJECTIVE / OVERNIGHT EVENTS: No events over night.   Note not saved in the system       T(C): 37.9 (04-07-20 @ 17:45), Max: 37.9 (04-07-20 @ 17:45)  HR: 88 (04-07-20 @ 18:42) (84 - 88)  BP: 113/68 (04-07-20 @ 18:42) (113/68 - 116/77)  RR: 18 (04-07-20 @ 18:42) (17 - 18)  SpO2: 94% (04-07-20 @ 18:42) (94% - 94%)    MEDICATIONS  (STANDING):  amLODIPine   Tablet 10 milliGRAM(s) Oral daily  aspirin enteric coated 81 milliGRAM(s) Oral daily  atorvastatin 40 milliGRAM(s) Oral at bedtime  cefTRIAXone   IVPB 1000 milliGRAM(s) IV Intermittent every 24 hours  heparin  Injectable 5000 Unit(s) SubCutaneous every 12 hours  hydroxychloroquine   Oral   hydroxychloroquine 200 milliGRAM(s) Oral every 12 hours    MEDICATIONS  (PRN):  acetaminophen   Tablet .. 650 milliGRAM(s) Oral every 4 hours PRN Temp greater or equal to 38.5C (101.3F)      CAPILLARY BLOOD GLUCOSE        I&O's Summary    07 Apr 2020 07:01  -  08 Apr 2020 00:58  --------------------------------------------------------  IN: 360 mL / OUT: 0 mL / NET: 360 mL      T(C): 37.9 (04-07-20 @ 17:45), Max: 37.9 (04-07-20 @ 17:45)  HR: 88 (04-07-20 @ 18:42) (84 - 88)  BP: 113/68 (04-07-20 @ 18:42) (113/68 - 116/77)  RR: 18 (04-07-20 @ 18:42) (17 - 18)  SpO2: 94% (04-07-20 @ 18:42) (94% - 94%)    PHYSICAL EXAM:  GENERAL: NAD, well-developed  HEAD:  Atraumatic, Normocephalic  EYES: EOMI, PERRLA, conjunctiva and sclera clear  NECK: Supple, No JVD  CHEST/LUNG: Clear to auscultation bilaterally; No wheeze  HEART: Regular rate and rhythm; No murmurs, rubs, or gallops  ABDOMEN: Soft, Nontender, Nondistended; Bowel sounds present  EXTREMITIES:  2+ Peripheral Pulses, No clubbing, cyanosis, or edema  PSYCH: AAOx3  NEUROLOGY: non-focal  SKIN: No rashes or lesions    LABS:                        15.1   8.46  )-----------( 183      ( 07 Apr 2020 11:12 )             45.0     04-07    146<H>  |  111<H>  |  53<H>  ----------------------------<  133<H>  4.0   |  24  |  1.78<H>    Ca    9.4      07 Apr 2020 11:12  Phos  3.3     04-06  Mg     2.6     04-06    TPro  7.9  /  Alb  2.5<L>  /  TBili  1.0  /  DBili  x   /  AST  103<H>  /  ALT  31  /  AlkPhos  84  04-06      CARDIAC MARKERS ( 06 Apr 2020 10:26 )  x     / x     / 2732 U/L / x     / x              RADIOLOGY & ADDITIONAL TESTS:    Imaging Personally Reviewed:    Consultant(s) Notes Reviewed:      Care Discussed with Consultants/Other Providers:

## 2020-04-06 NOTE — CONSULT NOTE ADULT - ASSESSMENT
pt presented with AMS,pneumonia on CXR and all w/u negative including COVID PCR   pt ahs a false negative  p' s/s ,blood work and CXR consistent with COIVD 19   will start treatment 87 yr old female  presented with AMS, pneumonia on CXR and all w/u negative including COVID PCR   pt has a false negative  p' s/s ,blood work and CXR consistent with COIVD 19   will start treatment      1.COVID pneumonia: CXR shows infiltrates    false negative covid pcr   with elevated Ferritin,LDH and CRP and lymphopenia, CXR B/L infiltrate more consistent with COVID  f/u on temp curve   cont current regimen plaquanil /azithro /rocephin  check PCT to r/o bacterial pnuemonia  trend ferritin, LDH,D Dimer, CRP  anticoagulation  supportive care   tyelnol  monitor pulse ox   likely dc soon 87 yr old female  presented with AMS, pneumonia on CXR and all w/u negative including COVID PCR   pt has a false negative  p' s/s ,blood work and CXR consistent with COIVD 19   will start treatment      1.COVID pneumonia: CXR shows infiltrates  'shortness of breath resolved  false negative covid pcr   with elevated Ferritin,LDH and CRP and lymphopenia, CXR B/L infiltrate more consistent with COVID  f/u on temp curve   cont current regimen plaquanil /azithro /rocephin  check PCT to r/o bacterial pnuemonia    2.AMS : resolved  can be sec to infection  trend ferritin, LDH,D Dimer, CRP  anticoagulation  supportive care   tyelnol  monitor pulse ox   likely dc soon

## 2020-04-06 NOTE — PROGRESS NOTE ADULT - SUBJECTIVE AND OBJECTIVE BOX
[facility] NEPHROLOGY SERVICES, [facility]  NEPHROLOGY AND HYPERTENSION  [address]  [address]  [place] [address]  [phone]    MD DUSTIN FRIAS, MD YANELY GODINEZ, MD DESIRAE LEZAMA, MD ADEEL JOSÉ, MD RAS AGOSTO MD          Patient comfortable      MEDICATIONS  (STANDING):  amLODIPine   Tablet 10 milliGRAM(s) Oral daily  aspirin enteric coated 81 milliGRAM(s) Oral daily  atorvastatin 40 milliGRAM(s) Oral at bedtime  cefTRIAXone   IVPB 1000 milliGRAM(s) IV Intermittent every 24 hours  heparin  Injectable 5000 Unit(s) SubCutaneous every 12 hours  hydroxychloroquine   Oral   hydroxychloroquine 400 milliGRAM(s) Oral every 12 hours  sodium chloride 0.45%. 1000 milliLiter(s) (60 mL/Hr) IV Continuous <Continuous>    MEDICATIONS  (PRN):  acetaminophen   Tablet .. 650 milliGRAM(s) Oral every 4 hours PRN Temp greater or equal to 38.5C (101.3F)      PHYSICAL EXAM:      T(C): 36.7 (04-06-20 @ 11:02), Max: 38.4 (04-06-20 @ 01:04)  HR: 59 (04-06-20 @ 11:02) (49 - 61)  BP: 119/62 (04-06-20 @ 11:02) (119/62 - 131/62)  RR: 24 (04-06-20 @ 11:02) (18 - 24)  SpO2: 94% (04-06-20 @ 11:02) (94% - 95%)  Wt(kg): --    Extremities:  1 edema                                    14.4   10.63 )-----------( 132      ( 06 Apr 2020 10:26 )             43.3     04-06    143  |  109<H>  |  54<H>  ----------------------------<  97  3.5   |  23  |  2.11<H>    Ca    9.0      06 Apr 2020 10:26  Phos  3.3     04-06  Mg     2.6     04-06    TPro  7.9  /  Alb  2.5<L>  /  TBili  1.0  /  DBili  x   /  AST  103<H>  /  ALT  31  /  AlkPhos  84  04-06    ABG - ( 04 Apr 2020 22:31 )  pH, Arterial: x     pH, Blood: 7.51  /  pCO2: 28    /  pO2: 74    / HCO3: 22    / Base Excess: 0.4   /  SaO2: 95                LIVER FUNCTIONS - ( 06 Apr 2020 10:26 )  Alb: 2.5 g/dL / Pro: 7.9 gm/dL / ALK PHOS: 84 U/L / ALT: 31 U/L / AST: 103 U/L / GGT: x           Creatinine Trend: 2.11<--, 2.55<--, 2.30<--      COVID-19 PCR . (04.05.20 @ 11:26)    COVID-19 PCR: NotDetec: This test has been validated by [facility]Setek to be accurate;    Assessment  PEDRO PABLO pre renal azotemia;   CKD 3   CAP  Stable  COVID neg     Plan   Empiric abx rx  D/C IVF for now.     Rosalio Ceja MD

## 2020-04-06 NOTE — CONSULT NOTE ADULT - SUBJECTIVE AND OBJECTIVE BOX
Infectious Diseases - Attending at Dr. Archuleta    HPI:  Patient is an 88 YO F with a PMH of HTN and HLD who presents to the ED for weakness and lethargy.  Patient admitted for evaluation for COVID19.  Has had symptoms for 4 days.  Symptoms started after her family had a party for her 87th birthday.  Patient reports hx of weakness, lethargy, and worsening dyspnea.  At baseline, patient ambulates without assistance.  Patient reportedly has not gotten out of bed in 2 days and is now incontinent.    Denies hx of fever, chills, cough.  Reports two days of nonbloody diarrhea as well.   No prior testing for COVID19, denies recent exposure to anyone with known COVID19.  Nonsmoker, NKDA.  95%on 5L O2 via NC.  Febrile to 101.7 in ED.   Labs show mild leukocytosis, increased Cr, lactic acidosis, and mild tropinemia.  CXR shows RLL opacities.  Will admit patient to floor for PNA. (2020 01:14)      PAST MEDICAL & SURGICAL HISTORY:  Presence of stent in coronary artery  CAD (coronary artery disease)  Hypercholesteremia  Benign essential HTN  H/O small bowel obstruction      Allergies    No Known Allergies    Intolerances        FAMILY HISTORY:  Family history of hypertension in brother (Sibling)      SOCIAL HISTORY:    REVIEW OF SYSTEMS:    Constitutional: No fever, weight loss or fatigue  Eyes: No eye pain, visual disturbances, or discharge  ENT:  No difficulty hearing, tinnitus, vertigo; No sinus or throat pain  Neck: No pain or stiffness  Respiratory: No cough, wheezing, chills or hemoptysis  Cardiovascular: No chest pain, palpitations, shortness of breath, dizziness or leg swelling  Gastrointestinal: No abdominal or epigastric pain. No nausea, vomiting or hematemesis; No diarrhea or constipation. No melena or hematochezia.  Genitourinary: No dysuria, frequency, hematuria or incontinence  Neurological: No headaches, memory loss, loss of strength, numbness or tremors  Skin: No itching, burning, rashes or lesions       MEDICATIONS  (STANDING):  amLODIPine   Tablet 10 milliGRAM(s) Oral daily  aspirin enteric coated 81 milliGRAM(s) Oral daily  atorvastatin 40 milliGRAM(s) Oral at bedtime  cefTRIAXone   IVPB 1000 milliGRAM(s) IV Intermittent every 24 hours  heparin  Injectable 5000 Unit(s) SubCutaneous every 12 hours  hydroxychloroquine   Oral   hydroxychloroquine 400 milliGRAM(s) Oral every 12 hours    MEDICATIONS  (PRN):  acetaminophen   Tablet .. 650 milliGRAM(s) Oral every 4 hours PRN Temp greater or equal to 38.5C (101.3F)      Vital Signs Last 24 Hrs  T(C): 36.8 (2020 21:33), Max: 38.4 (2020 01:04)  T(F): 98.2 (:33), Max: 101.2 (2020 01:04)  HR: 87 (2020 21:33) (56 - 87)  BP: 124/80 (2020 21:33) (119/62 - 131/62)  BP(mean): --  RR: 16 (:33) (16 - 24)  SpO2: 94% (2020 21:33) (94% - 95%)    PHYSICAL EXAM:    Constitutional: NAD, well-groomed, well-developed  HEENT: PERRLA, EOMI, Normal Hearing, MMM  Neck: No LAD, No JVD  Back: Normal spine flexure, No CVA tenderness  Respiratory: CTAB/L  Cardiovascular: S1 and S2, RRR, no M/G/R  Gastrointestinal: BS+, soft, NT/ND  Extremities: No peripheral edema  Vascular: 2+ peripheral pulses  Neurological: A/O x 3, no focal deficits  Skin: No rashes      LABS:                        14.4   10.63 )-----------( 132      ( 2020 10:26 )             43.3     04-06    143  |  109<H>  |  54<H>  ----------------------------<  97  3.5   |  23  |  2.11<H>    Ca    9.0      2020 10:26  Phos  3.3     04-06  Mg     2.6     04-06    TPro  7.9  /  Alb  2.5<L>  /  TBili  1.0  /  DBili  x   /  AST  103<H>  /  ALT  31  /  AlkPhos  84  04-06      Urinalysis Basic - ( 2020 23:48 )    Color: Yellow / Appearance: Clear / S.020 / pH: x  Gluc: x / Ketone: Negative  / Bili: Negative / Urobili: Negative mg/dL   Blood: x / Protein: 100 mg/dL / Nitrite: Negative   Leuk Esterase: Negative / RBC: Negative /HPF / WBC 0-2   Sq Epi: x / Non Sq Epi: Occasional / Bacteria: Moderate        MICROBIOLOGY:  RECENT CULTURES:   .Urine Clean Catch (Midstream) XXXX XXXX   <10,000 CFU/mL Normal Urogenital Agueda     .Blood Blood XXXX XXXX   No growth to date.          RADIOLOGY & ADDITIONAL STUDIES: Infectious Diseases - Attending at Dr. Archuleta    HPI:  Patient is an 88 YO F with a PMH of HTN and HLD who presents to the ED for weakness and lethargy.  Patient admitted for evaluation for COVID19.  Has had symptoms for 4 days.  Symptoms started after her family had a party for her 87th birthday.  Patient reports hx of weakness, lethargy, and worsening dyspnea.  At baseline, patient ambulates without assistance.  Patient reportedly has not gotten out of bed in 2 days and is now incontinent.    Denies hx of fever, chills, cough.  Reports two days of nonbloody diarrhea as well.   No prior testing for COVID19, denies recent exposure to anyone with known COVID19.  Nonsmoker, NKDA.  95%on 5L O2 via NC.  Febrile to 101.7 in ED.   Labs show mild leukocytosis, increased Cr, lactic acidosis, and mild tropinemia.  CXR shows RLL opacities.  Will admit patient to floor for PNA. (2020 01:14)      PAST MEDICAL & SURGICAL HISTORY:  Presence of stent in coronary artery  CAD (coronary artery disease)  Hypercholesteremia  Benign essential HTN  H/O small bowel obstruction      Allergies    No Known Allergies    Intolerances        FAMILY HISTORY:  Family history of hypertension in brother (Sibling)      SOCIAL HISTORY: non smoker    REVIEW OF SYSTEMS:    Constitutional: No fever, weight loss +fatigue  Eyes: No eye pain, visual disturbances, or discharge  ENT:  No difficulty hearing, tinnitus, vertigo; No sinus or throat pain  Neck: No pain or stiffness  Respiratory: No cough, wheezing, chills or hemoptysis  Cardiovascular: No chest pain, palpitations, +shortness of breath, no dizziness or leg swelling  Gastrointestinal: No abdominal or epigastric pain. No nausea, vomiting or hematemesis; No diarrhea or constipation. No melena or hematochezia.  Genitourinary: No dysuria, frequency, hematuria or incontinence  Neurological: No headaches, memory loss, loss of strength, numbness or tremors  Skin: No itching, burning, rashes or lesions       MEDICATIONS  (STANDING):  amLODIPine   Tablet 10 milliGRAM(s) Oral daily  aspirin enteric coated 81 milliGRAM(s) Oral daily  atorvastatin 40 milliGRAM(s) Oral at bedtime  cefTRIAXone   IVPB 1000 milliGRAM(s) IV Intermittent every 24 hours  heparin  Injectable 5000 Unit(s) SubCutaneous every 12 hours  hydroxychloroquine   Oral   hydroxychloroquine 400 milliGRAM(s) Oral every 12 hours    MEDICATIONS  (PRN):  acetaminophen   Tablet .. 650 milliGRAM(s) Oral every 4 hours PRN Temp greater or equal to 38.5C (101.3F)      Vital Signs Last 24 Hrs  T(C): 36.8 (2020 21:33), Max: 38.4 (2020 01:04)  T(F): 98.2 (2020 21:33), Max: 101.2 (2020 01:04)  HR: 87 (2020 21:33) (56 - 87)  BP: 124/80 (2020 21:33) (119/62 - 131/62)  BP(mean): --  RR: 16 (2020 21:33) (16 - 24)  SpO2: 94% (2020 21:33) (94% - 95%)    PHYSICAL EXAM:    Constitutional: NAD, well-groomed, well-developed  HEENT: PERRLA, EOMI, Normal Hearing, MMM  Neck: No LAD, No JVD  Back: Normal spine flexure, No CVA tenderness  Respiratory: CTAB/L  Cardiovascular: S1 and S2, RRR, no M/G/R  Gastrointestinal: BS+, soft, NT/ND  Extremities: No peripheral edema  Vascular: 2+ peripheral pulses  Neurological: A/O x 3, no focal deficits  Skin: No rashes      LABS:                        14.4   10.63 )-----------( 132      ( 2020 10:26 )             43.3     04-06    143  |  109<H>  |  54<H>  ----------------------------<  97  3.5   |  23  |  2.11<H>    Ca    9.0      2020 10:26  Phos  3.3     04-06  Mg     2.6     04-06    TPro  7.9  /  Alb  2.5<L>  /  TBili  1.0  /  DBili  x   /  AST  103<H>  /  ALT  31  /  AlkPhos  84  04-06      Urinalysis Basic - ( 2020 23:48 )    Color: Yellow / Appearance: Clear / S.020 / pH: x  Gluc: x / Ketone: Negative  / Bili: Negative / Urobili: Negative mg/dL   Blood: x / Protein: 100 mg/dL / Nitrite: Negative   Leuk Esterase: Negative / RBC: Negative /HPF / WBC 0-2   Sq Epi: x / Non Sq Epi: Occasional / Bacteria: Moderate        MICROBIOLOGY:  RECENT CULTURES:   .Urine Clean Catch (Midstream) XXXX XXXX   <10,000 CFU/mL Normal Urogenital Agueda     .Blood Blood XXXX XXXX   No growth to date.          RADIOLOGY & ADDITIONAL STUDIES:

## 2020-04-06 NOTE — PROGRESS NOTE ADULT - ASSESSMENT
Patient is an 88 YO F with a PMH of HTN and HLD who presents to the ED for weakness and lethargy.  Patient admitted for evaluation for COVID19.  Has had symptoms for 4 days.  Symptoms started after her family had a party for her 87th birthday.  Patient reports hx of weakness, lethargy, and worsening dyspnea.  At baseline, patient ambulates without assistance.  Patient reportedly has not gotten out of bed in 2 days and is now incontinent.    Denies hx of fever, chills, cough.  Reports two days of nonbloody diarrhea as well.   No prior testing for COVID19, denies recent exposure to anyone with known COVID19.  Nonsmoker, NKDA.  95%on 5L O2 via NC.  Febrile to 101.7 in ED.   Labs show mild leukocytosis, increased Cr, lactic acidosis, and mild tropinemia.  CXR shows RLL opacities.  Will admit patient to floor for PNA.      IMPROVE VTE Individual Risk Assessment          RISK                                                          Points  [  ] Previous VTE                                                3  [  ] Thrombophilia                                             2  [  ] Lower limb paralysis                                    2        (unable to hold up >15 seconds)    [  ] Current Cancer                                             2         (within 6 months)  [  ] Immobilization > 24 hrs                              1  [  ] ICU/CCU stay > 24 hours                            1  [  ] Age > 60                                                    1    IMPROVE VTE Score - 1

## 2020-04-07 LAB
ANION GAP SERPL CALC-SCNC: 11 MMOL/L — SIGNIFICANT CHANGE UP (ref 5–17)
BUN SERPL-MCNC: 53 MG/DL — HIGH (ref 7–23)
CALCIUM SERPL-MCNC: 9.4 MG/DL — SIGNIFICANT CHANGE UP (ref 8.5–10.1)
CHLORIDE SERPL-SCNC: 111 MMOL/L — HIGH (ref 96–108)
CO2 SERPL-SCNC: 24 MMOL/L — SIGNIFICANT CHANGE UP (ref 22–31)
CREAT SERPL-MCNC: 1.78 MG/DL — HIGH (ref 0.5–1.3)
CULTURE RESULTS: NO GROWTH — SIGNIFICANT CHANGE UP
GLUCOSE SERPL-MCNC: 133 MG/DL — HIGH (ref 70–99)
HCT VFR BLD CALC: 45 % — SIGNIFICANT CHANGE UP (ref 34.5–45)
HGB BLD-MCNC: 15.1 G/DL — SIGNIFICANT CHANGE UP (ref 11.5–15.5)
MCHC RBC-ENTMCNC: 26.6 PG — LOW (ref 27–34)
MCHC RBC-ENTMCNC: 33.6 GM/DL — SIGNIFICANT CHANGE UP (ref 32–36)
MCV RBC AUTO: 79.2 FL — LOW (ref 80–100)
NRBC # BLD: 0 /100 WBCS — SIGNIFICANT CHANGE UP (ref 0–0)
PLATELET # BLD AUTO: 183 K/UL — SIGNIFICANT CHANGE UP (ref 150–400)
POTASSIUM SERPL-MCNC: 4 MMOL/L — SIGNIFICANT CHANGE UP (ref 3.5–5.3)
POTASSIUM SERPL-SCNC: 4 MMOL/L — SIGNIFICANT CHANGE UP (ref 3.5–5.3)
RBC # BLD: 5.68 M/UL — HIGH (ref 3.8–5.2)
RBC # FLD: 16.5 % — HIGH (ref 10.3–14.5)
SARS-COV-2 RNA SPEC QL NAA+PROBE: SIGNIFICANT CHANGE UP
SODIUM SERPL-SCNC: 146 MMOL/L — HIGH (ref 135–145)
SPECIMEN SOURCE: SIGNIFICANT CHANGE UP
WBC # BLD: 8.46 K/UL — SIGNIFICANT CHANGE UP (ref 3.8–10.5)
WBC # FLD AUTO: 8.46 K/UL — SIGNIFICANT CHANGE UP (ref 3.8–10.5)

## 2020-04-07 PROCEDURE — 99232 SBSQ HOSP IP/OBS MODERATE 35: CPT

## 2020-04-07 RX ADMIN — Medication 200 MILLIGRAM(S): at 16:25

## 2020-04-07 RX ADMIN — ATORVASTATIN CALCIUM 40 MILLIGRAM(S): 80 TABLET, FILM COATED ORAL at 22:27

## 2020-04-07 RX ADMIN — AZITHROMYCIN 500 MILLIGRAM(S): 500 TABLET, FILM COATED ORAL at 11:06

## 2020-04-07 RX ADMIN — Medication 81 MILLIGRAM(S): at 11:07

## 2020-04-07 RX ADMIN — HEPARIN SODIUM 5000 UNIT(S): 5000 INJECTION INTRAVENOUS; SUBCUTANEOUS at 16:34

## 2020-04-07 RX ADMIN — AMLODIPINE BESYLATE 10 MILLIGRAM(S): 2.5 TABLET ORAL at 05:47

## 2020-04-07 RX ADMIN — CEFTRIAXONE 100 MILLIGRAM(S): 500 INJECTION, POWDER, FOR SOLUTION INTRAMUSCULAR; INTRAVENOUS at 11:06

## 2020-04-07 RX ADMIN — Medication 400 MILLIGRAM(S): at 04:42

## 2020-04-07 RX ADMIN — HEPARIN SODIUM 5000 UNIT(S): 5000 INJECTION INTRAVENOUS; SUBCUTANEOUS at 05:47

## 2020-04-07 NOTE — PHYSICAL THERAPY INITIAL EVALUATION ADULT - TRANSFER TRAINING, PT EVAL
Pt will independently perform sit to/from stand transfers without LOB using [DEVICE/NO DEVICE] by [X] weeks

## 2020-04-07 NOTE — PROGRESS NOTE ADULT - ASSESSMENT
87 yr old female seen with   1.COVID pneumonia:CXR shows infiltrates   pt feels much better   false negative covid pcr   with elevated Ferritin,LDH and CRP and lymphopenia, CXR B/L infiltrate more consistent with COVID  f/u on temp curve   cont current regimen plaquanil /azithro   trend ferritin, LDH,D Dimer, CRP  anticoagulation  supportive care   tyelnol  monitor pulse ox   likely dc soon

## 2020-04-07 NOTE — PROGRESS NOTE ADULT - PROBLEM SELECTOR PLAN 1
COVID 19 negative, likely false negative, ID consult appreciated, will continue with  Plaquenil and Ceftriaxone. Blood cultures no growth to date.

## 2020-04-07 NOTE — PROGRESS NOTE ADULT - SUBJECTIVE AND OBJECTIVE BOX
[facility] NEPHROLOGY SERVICES, [facility]  NEPHROLOGY AND HYPERTENSION  [address]  [address]  [place] [address]  [phone]    MD DUSTIN FRIAS, MD YANELY GODINEZ, MD DESIRAE LEZAMA, MD ADEEL JOSÉ, MD RAS AGOSTO MD          Patient comfortable no distress      MEDICATIONS  (STANDING):  amLODIPine   Tablet 10 milliGRAM(s) Oral daily  aspirin enteric coated 81 milliGRAM(s) Oral daily  atorvastatin 40 milliGRAM(s) Oral at bedtime  cefTRIAXone   IVPB 1000 milliGRAM(s) IV Intermittent every 24 hours  heparin  Injectable 5000 Unit(s) SubCutaneous every 12 hours  hydroxychloroquine   Oral   hydroxychloroquine 200 milliGRAM(s) Oral every 12 hours    MEDICATIONS  (PRN):  acetaminophen   Tablet .. 650 milliGRAM(s) Oral every 4 hours PRN Temp greater or equal to 38.5C (101.3F)      PHYSICAL EXAM:      T(C): 36.5 (04-07-20 @ 11:30), Max: 37.1 (04-07-20 @ 04:31)  HR: 88 (04-07-20 @ 18:42) (82 - 98)  BP: 113/68 (04-07-20 @ 18:42) (105/67 - 125/94)  RR: 18 (04-07-20 @ 18:42) (16 - 25)  SpO2: 94% (04-07-20 @ 18:42) (91% - 96%)  Wt(kg): --  Respiratory: clear anteriorly, decreased BS at bases  Cardiovascular: S1 S2  Gastrointestinal: soft NT ND +BS  Extremities:   1 edema                                    15.1   8.46  )-----------( 183      ( 07 Apr 2020 11:12 )             45.0     04-07    146<H>  |  111<H>  |  53<H>  ----------------------------<  133<H>  4.0   |  24  |  1.78<H>    Ca    9.4      07 Apr 2020 11:12  Phos  3.3     04-06  Mg     2.6     04-06    TPro  7.9  /  Alb  2.5<L>  /  TBili  1.0  /  DBili  x   /  AST  103<H>  /  ALT  31  /  AlkPhos  84  04-06      LIVER FUNCTIONS - ( 06 Apr 2020 10:26 )  Alb: 2.5 g/dL / Pro: 7.9 gm/dL / ALK PHOS: 84 U/L / ALT: 31 U/L / AST: 103 U/L / GGT: x           Creatinine Trend: 1.78<--, 2.11<--, 2.55<--, 2.30<--        Assessment  PEDRO PABLO pre renal azotemia;   CKD 3   CAP  Stable  COVID neg     Plan   Empiric abx rx  Rosalio Ceja MD

## 2020-04-07 NOTE — PHYSICAL THERAPY INITIAL EVALUATION ADULT - ACTIVE RANGE OF MOTION EXAMINATION, REHAB EVAL
shannan. upper extremity Active ROM was WNL (within normal limits)/bilateral lower extremity Active ROM was WNL (within normal limits)

## 2020-04-07 NOTE — PROGRESS NOTE ADULT - SUBJECTIVE AND OBJECTIVE BOX
Patient is a 87y old  Female who presents with a chief complaint of ams (06 Apr 2020 22:10)      INTERVAL HPI / OVERNIGHT EVENTS: doing much better    MEDICATIONS  (STANDING):  amLODIPine   Tablet 10 milliGRAM(s) Oral daily  aspirin enteric coated 81 milliGRAM(s) Oral daily  atorvastatin 40 milliGRAM(s) Oral at bedtime  azithromycin   Tablet 500 milliGRAM(s) Oral every 24 hours  cefTRIAXone   IVPB 1000 milliGRAM(s) IV Intermittent every 24 hours  heparin  Injectable 5000 Unit(s) SubCutaneous every 12 hours  hydroxychloroquine   Oral   hydroxychloroquine 200 milliGRAM(s) Oral every 12 hours    MEDICATIONS  (PRN):  acetaminophen   Tablet .. 650 milliGRAM(s) Oral every 4 hours PRN Temp greater or equal to 38.5C (101.3F)      Vital Signs Last 24 Hrs  T(C): 36.5 (07 Apr 2020 11:30), Max: 37.1 (07 Apr 2020 04:31)  T(F): 97.7 (07 Apr 2020 11:30), Max: 98.7 (07 Apr 2020 04:31)  HR: 82 (07 Apr 2020 11:30) (58 - 98)  BP: 116/74 (07 Apr 2020 11:30) (105/67 - 125/94)  BP(mean): --  RR: 18 (07 Apr 2020 11:30) (16 - 25)  SpO2: 94% (07 Apr 2020 11:30) (91% - 96%)    Review of systems:  General : no fever /chills, fatigue  CVS : no chest pain, palpitations  Lungs : no shortness of breath, cough  GI : no abdominal pain,vomiting, diarrhea   : no dysuria,hematuria        PHYSICAL EXAM:  General :NAD  Constitutional:  well-groomed, well-developed  Respiratory: CTAB/L  Cardiovascular: S1 and S2, RRR, no M/G/R  Gastrointestinal: BS+, soft, NT/ND  Extremities: No peripheral edema  Vascular: 2+ peripheral pulses  Skin: No rashes      LABS:                        15.1   8.46  )-----------( 183      ( 07 Apr 2020 11:12 )             45.0     04-07    146<H>  |  111<H>  |  53<H>  ----------------------------<  133<H>  4.0   |  24  |  1.78<H>    Ca    9.4      07 Apr 2020 11:12  Phos  3.3     04-06  Mg     2.6     04-06    TPro  7.9  /  Alb  2.5<L>  /  TBili  1.0  /  DBili  x   /  AST  103<H>  /  ALT  31  /  AlkPhos  84  04-06          MICROBIOLOGY:  RECENT CULTURES:  04-06 .Urine Clean Catch (Midstream) XXXX XXXX   No growth    04-05 .Urine Clean Catch (Midstream) XXXX XXXX   <10,000 CFU/mL Normal Urogenital Agueda    04-05 .Blood Blood XXXX XXXX   No growth to date.          RADIOLOGY & ADDITIONAL STUDIES: Patient is a 87y old  Female who presents with a chief complaint of ams (06 Apr 2020 22:10)      INTERVAL HPI / OVERNIGHT EVENTS: doing much better    MEDICATIONS  (STANDING):  amLODIPine   Tablet 10 milliGRAM(s) Oral daily  aspirin enteric coated 81 milliGRAM(s) Oral daily  atorvastatin 40 milliGRAM(s) Oral at bedtime  azithromycin   Tablet 500 milliGRAM(s) Oral every 24 hours  cefTRIAXone   IVPB 1000 milliGRAM(s) IV Intermittent every 24 hours  heparin  Injectable 5000 Unit(s) SubCutaneous every 12 hours  hydroxychloroquine   Oral   hydroxychloroquine 200 milliGRAM(s) Oral every 12 hours    MEDICATIONS  (PRN):  acetaminophen   Tablet .. 650 milliGRAM(s) Oral every 4 hours PRN Temp greater or equal to 38.5C (101.3F)      Vital Signs Last 24 Hrs  T(C): 36.5 (07 Apr 2020 11:30), Max: 37.1 (07 Apr 2020 04:31)  T(F): 97.7 (07 Apr 2020 11:30), Max: 98.7 (07 Apr 2020 04:31)  HR: 82 (07 Apr 2020 11:30) (58 - 98)  BP: 116/74 (07 Apr 2020 11:30) (105/67 - 125/94)  BP(mean): --  RR: 18 (07 Apr 2020 11:30) (16 - 25)  SpO2: 94% (07 Apr 2020 11:30) (91% - 96%)    Review of systems:  General : no fever /chills, fatigue  CVS : no chest pain, palpitations  Lungs : no shortness of breath, cough  GI : no abdominal pain,vomiting, diarrhea   : no dysuria,hematuria        PHYSICAL EXAM:  General :NAD  Constitutional:  well-groomed, well-developed  Respiratory: CTAB/L  Cardiovascular: S1 and S2, RRR, no M/G/R  Gastrointestinal: BS+, soft, NT/ND  Extremities: No peripheral edema  Vascular: 2+ peripheral pulses  Skin: No rashes      LABS:                        15.1   8.46  )-----------( 183      ( 07 Apr 2020 11:12 )             45.0     04-07    146<H>  |  111<H>  |  53<H>  ----------------------------<  133<H>  4.0   |  24  |  1.78<H>    Ca    9.4      07 Apr 2020 11:12  Phos  3.3     04-06  Mg     2.6     04-06    TPro  7.9  /  Alb  2.5<L>  /  TBili  1.0  /  DBili  x   /  AST  103<H>  /  ALT  31  /  AlkPhos  84  04-06    Ferritin, Serum (04.05.20 @ 11:32)    Ferritin, Serum: 1038 ng/mL  Lactate Dehydrogenase, Serum (04.06.20 @ 13:33)    Lactate Dehydrogenase, Serum: 484 U/L  C-Reactive Protein, Serum (04.06.20 @ 13:33)    C-Reactive Protein, Serum: 21.45 mg/dL                MICROBIOLOGY:  RECENT CULTURES:  04-06 .Urine Clean Catch (Midstream) XXXX XXXX   No growth    04-05 .Urine Clean Catch (Midstream) XXXX XXXX   <10,000 CFU/mL Normal Urogenital Agueda    04-05 .Blood Blood XXXX XXXX   No growth to date.          RADIOLOGY & ADDITIONAL STUDIES:

## 2020-04-07 NOTE — PROGRESS NOTE ADULT - SUBJECTIVE AND OBJECTIVE BOX
Patient is a 87y old  Female who presents with a chief complaint of ams (07 Apr 2020 20:11)      SUBJECTIVE / OVERNIGHT EVENTS: No events over night. Patient sating well on NC     T(C): 36.5 (04-07-20 @ 11:30), Max: 36.5 (04-07-20 @ 11:30)  HR: 88 (04-07-20 @ 18:42) (82 - 88)  BP: 113/68 (04-07-20 @ 18:42) (113/68 - 116/74)  RR: 18 (04-07-20 @ 18:42) (18 - 18)  SpO2: 94% (04-07-20 @ 18:42) (94% - 94%)    MEDICATIONS  (STANDING):  amLODIPine   Tablet 10 milliGRAM(s) Oral daily  aspirin enteric coated 81 milliGRAM(s) Oral daily  atorvastatin 40 milliGRAM(s) Oral at bedtime  cefTRIAXone   IVPB 1000 milliGRAM(s) IV Intermittent every 24 hours  heparin  Injectable 5000 Unit(s) SubCutaneous every 12 hours  hydroxychloroquine   Oral   hydroxychloroquine 200 milliGRAM(s) Oral every 12 hours    MEDICATIONS  (PRN):  acetaminophen   Tablet .. 650 milliGRAM(s) Oral every 4 hours PRN Temp greater or equal to 38.5C (101.3F)      CAPILLARY BLOOD GLUCOSE        I&O's Summary    T(C): 36.5 (04-07-20 @ 11:30), Max: 36.5 (04-07-20 @ 11:30)  HR: 88 (04-07-20 @ 18:42) (82 - 88)  BP: 113/68 (04-07-20 @ 18:42) (113/68 - 116/74)  RR: 18 (04-07-20 @ 18:42) (18 - 18)  SpO2: 94% (04-07-20 @ 18:42) (94% - 94%)    PHYSICAL EXAM:  GENERAL: NAD, well-developed  HEAD:  Atraumatic, Normocephalic  EYES: EOMI, PERRLA, conjunctiva and sclera clear  NECK: Supple, No JVD  CHEST/LUNG: Clear to auscultation bilaterally; No wheeze  HEART: Regular rate and rhythm; No murmurs, rubs, or gallops  ABDOMEN: Soft, Nontender, Nondistended; Bowel sounds present  EXTREMITIES:  2+ Peripheral Pulses, No clubbing, cyanosis, or edema  PSYCH: AAOx3  NEUROLOGY: non-focal  SKIN: No rashes or lesions    LABS:                        15.1   8.46  )-----------( 183      ( 07 Apr 2020 11:12 )             45.0     04-07    146<H>  |  111<H>  |  53<H>  ----------------------------<  133<H>  4.0   |  24  |  1.78<H>    Ca    9.4      07 Apr 2020 11:12  Phos  3.3     04-06  Mg     2.6     04-06    TPro  7.9  /  Alb  2.5<L>  /  TBili  1.0  /  DBili  x   /  AST  103<H>  /  ALT  31  /  AlkPhos  84  04-06      CARDIAC MARKERS ( 06 Apr 2020 10:26 )  x     / x     / 2732 U/L / x     / x              RADIOLOGY & ADDITIONAL TESTS:    Imaging Personally Reviewed:    Consultant(s) Notes Reviewed:      Care Discussed with Consultants/Other Providers:

## 2020-04-07 NOTE — PHYSICAL THERAPY INITIAL EVALUATION ADULT - PERTINENT HX OF CURRENT PROBLEM, REHAB EVAL
86 YO F with a PMH of HTN and HLD who presents to the ED for weakness and lethargy.  Patient admitted for evaluation for COVID19.  Has had symptoms for 4 days

## 2020-04-07 NOTE — PHYSICAL THERAPY INITIAL EVALUATION ADULT - BED MOBILITY TRAINING, PT EVAL
Pt will independently perform all aspects of bed mobility to help prevent pressure ulcers, by 6 weeks

## 2020-04-08 VITALS
DIASTOLIC BLOOD PRESSURE: 66 MMHG | SYSTOLIC BLOOD PRESSURE: 99 MMHG | RESPIRATION RATE: 18 BRPM | OXYGEN SATURATION: 95 % | TEMPERATURE: 97 F | HEART RATE: 82 BPM

## 2020-04-08 PROCEDURE — 99239 HOSP IP/OBS DSCHRG MGMT >30: CPT

## 2020-04-08 PROCEDURE — 99231 SBSQ HOSP IP/OBS SF/LOW 25: CPT

## 2020-04-08 RX ADMIN — Medication 200 MILLIGRAM(S): at 05:55

## 2020-04-08 RX ADMIN — AMLODIPINE BESYLATE 10 MILLIGRAM(S): 2.5 TABLET ORAL at 05:56

## 2020-04-08 RX ADMIN — CEFTRIAXONE 100 MILLIGRAM(S): 500 INJECTION, POWDER, FOR SOLUTION INTRAMUSCULAR; INTRAVENOUS at 11:08

## 2020-04-08 RX ADMIN — HEPARIN SODIUM 5000 UNIT(S): 5000 INJECTION INTRAVENOUS; SUBCUTANEOUS at 05:55

## 2020-04-08 NOTE — PROGRESS NOTE ADULT - SUBJECTIVE AND OBJECTIVE BOX
Patient is a 87y old  Female who presents with a chief complaint of ams (07 Apr 2020 20:11)      SUBJECTIVE / OVERNIGHT EVENTS: No events over night. Patient sating well on NC         MEDICATIONS  (STANDING):  amLODIPine   Tablet 10 milliGRAM(s) Oral daily  aspirin enteric coated 81 milliGRAM(s) Oral daily  atorvastatin 40 milliGRAM(s) Oral at bedtime  cefTRIAXone   IVPB 1000 milliGRAM(s) IV Intermittent every 24 hours  heparin  Injectable 5000 Unit(s) SubCutaneous every 12 hours  hydroxychloroquine   Oral   hydroxychloroquine 200 milliGRAM(s) Oral every 12 hours    MEDICATIONS  (PRN):  acetaminophen   Tablet .. 650 milliGRAM(s) Oral every 4 hours PRN Temp greater or equal to 38.5C (101.3F)    CAPILLARY BLOOD GLUCOSE        Vital Signs Last 24 Hrs  T(C): 36.1 (08 Apr 2020 10:50), Max: 37.9 (07 Apr 2020 17:45)  T(F): 97 (08 Apr 2020 10:50), Max: 100.3 (07 Apr 2020 17:45)  HR: 82 (08 Apr 2020 10:50) (78 - 88)  BP: 99/66 (08 Apr 2020 10:50) (99/66 - 116/77)  BP(mean): --  RR: 18 (08 Apr 2020 10:50) (17 - 18)  SpO2: 95% (08 Apr 2020 10:50) (94% - 95%)      PHYSICAL EXAM:  GENERAL: NAD, well-developed  HEAD:  Atraumatic, Normocephalic  EYES: EOMI, PERRLA, conjunctiva and sclera clear  NECK: Supple, No JVD  CHEST/LUNG: Clear to auscultation bilaterally; No wheeze  HEART: Regular rate and rhythm; No murmurs, rubs, or gallops  ABDOMEN: Soft, Nontender, Nondistended; Bowel sounds present  EXTREMITIES:  2+ Peripheral Pulses, No clubbing, cyanosis, or edema  PSYCH: AAOx3  NEUROLOGY: non-focal  SKIN: No rashes or lesions    LABS:                                              15.1   8.46  )-----------( 183      ( 07 Apr 2020 11:12 )             45.0     04-07    146<H>  |  111<H>  |  53<H>  ----------------------------<  133<H>  4.0   |  24  |  1.78<H>    Ca    9.4      07 Apr 2020 11:12              RADIOLOGY & ADDITIONAL TESTS:    Imaging Personally Reviewed:    Consultant(s) Notes Reviewed:      Care Discussed with Consultants/Other Providers:

## 2020-04-08 NOTE — PROGRESS NOTE ADULT - PROBLEM SELECTOR PLAN 1
COVID 19 negative x2 ,  ID consult appreciated, will continue with  Plaquenil and Ceftriaxone as there is concern for covid still as all inflammatory markers remain elevated. Blood cultures no growth to date.  doing well on NC

## 2020-04-09 LAB
CULTURE RESULTS: SIGNIFICANT CHANGE UP
CULTURE RESULTS: SIGNIFICANT CHANGE UP
SPECIMEN SOURCE: SIGNIFICANT CHANGE UP
SPECIMEN SOURCE: SIGNIFICANT CHANGE UP

## 2020-04-17 DIAGNOSIS — R50.9 FEVER, UNSPECIFIED: ICD-10-CM

## 2020-04-17 DIAGNOSIS — U07.1 COVID-19: ICD-10-CM

## 2020-04-17 DIAGNOSIS — N17.9 ACUTE KIDNEY FAILURE, UNSPECIFIED: ICD-10-CM

## 2020-04-17 DIAGNOSIS — I12.9 HYPERTENSIVE CHRONIC KIDNEY DISEASE WITH STAGE 1 THROUGH STAGE 4 CHRONIC KIDNEY DISEASE, OR UNSPECIFIED CHRONIC KIDNEY DISEASE: ICD-10-CM

## 2020-04-17 DIAGNOSIS — N18.3 CHRONIC KIDNEY DISEASE, STAGE 3 (MODERATE): ICD-10-CM

## 2020-04-17 DIAGNOSIS — E87.2 ACIDOSIS: ICD-10-CM

## 2020-04-17 DIAGNOSIS — R79.89 OTHER SPECIFIED ABNORMAL FINDINGS OF BLOOD CHEMISTRY: ICD-10-CM

## 2020-04-17 DIAGNOSIS — E78.5 HYPERLIPIDEMIA, UNSPECIFIED: ICD-10-CM

## 2020-04-17 DIAGNOSIS — R32 UNSPECIFIED URINARY INCONTINENCE: ICD-10-CM

## 2020-07-28 NOTE — ED CDU PROVIDER NOTE - GASTROINTESTINAL, MLM
Faxed patient's information sheet to [facility] along with her prescription. Abdomen soft, non-tender, no rebound, no guarding.

## 2021-05-16 ENCOUNTER — INPATIENT (INPATIENT)
Facility: HOSPITAL | Age: 86
LOS: 1 days | Discharge: HOME | End: 2021-05-18
Attending: INTERNAL MEDICINE | Admitting: INTERNAL MEDICINE
Payer: MEDICARE

## 2021-05-16 VITALS
TEMPERATURE: 99 F | HEART RATE: 107 BPM | DIASTOLIC BLOOD PRESSURE: 93 MMHG | SYSTOLIC BLOOD PRESSURE: 188 MMHG | HEIGHT: 65 IN | RESPIRATION RATE: 16 BRPM | OXYGEN SATURATION: 100 % | WEIGHT: 164.91 LBS

## 2021-05-16 DIAGNOSIS — I10 ESSENTIAL (PRIMARY) HYPERTENSION: ICD-10-CM

## 2021-05-16 DIAGNOSIS — R77.8 OTHER SPECIFIED ABNORMALITIES OF PLASMA PROTEINS: ICD-10-CM

## 2021-05-16 DIAGNOSIS — N18.30 CHRONIC KIDNEY DISEASE, STAGE 3 UNSPECIFIED: ICD-10-CM

## 2021-05-16 DIAGNOSIS — Z87.19 PERSONAL HISTORY OF OTHER DISEASES OF THE DIGESTIVE SYSTEM: Chronic | ICD-10-CM

## 2021-05-16 DIAGNOSIS — R42 DIZZINESS AND GIDDINESS: ICD-10-CM

## 2021-05-16 LAB
ALBUMIN SERPL ELPH-MCNC: 3.6 G/DL — SIGNIFICANT CHANGE UP (ref 3.3–5)
ALP SERPL-CCNC: 122 U/L — HIGH (ref 40–120)
ALT FLD-CCNC: 27 U/L — SIGNIFICANT CHANGE UP (ref 12–78)
ANION GAP SERPL CALC-SCNC: 11 MMOL/L — SIGNIFICANT CHANGE UP (ref 5–17)
APTT BLD: 24 SEC — LOW (ref 27.5–35.5)
AST SERPL-CCNC: 29 U/L — SIGNIFICANT CHANGE UP (ref 15–37)
BASOPHILS # BLD AUTO: 0.03 K/UL — SIGNIFICANT CHANGE UP (ref 0–0.2)
BASOPHILS NFR BLD AUTO: 0.5 % — SIGNIFICANT CHANGE UP (ref 0–2)
BILIRUB SERPL-MCNC: 0.4 MG/DL — SIGNIFICANT CHANGE UP (ref 0.2–1.2)
BUN SERPL-MCNC: 19 MG/DL — SIGNIFICANT CHANGE UP (ref 7–23)
CALCIUM SERPL-MCNC: 9.3 MG/DL — SIGNIFICANT CHANGE UP (ref 8.5–10.1)
CHLORIDE SERPL-SCNC: 107 MMOL/L — SIGNIFICANT CHANGE UP (ref 96–108)
CO2 SERPL-SCNC: 23 MMOL/L — SIGNIFICANT CHANGE UP (ref 22–31)
CREAT SERPL-MCNC: 1.52 MG/DL — HIGH (ref 0.5–1.3)
EOSINOPHIL # BLD AUTO: 0.01 K/UL — SIGNIFICANT CHANGE UP (ref 0–0.5)
EOSINOPHIL NFR BLD AUTO: 0.2 % — SIGNIFICANT CHANGE UP (ref 0–6)
FLUAV AG NPH QL: SIGNIFICANT CHANGE UP
FLUBV AG NPH QL: SIGNIFICANT CHANGE UP
GLUCOSE SERPL-MCNC: 100 MG/DL — HIGH (ref 70–99)
HCT VFR BLD CALC: 43.4 % — SIGNIFICANT CHANGE UP (ref 34.5–45)
HGB BLD-MCNC: 14.8 G/DL — SIGNIFICANT CHANGE UP (ref 11.5–15.5)
IMM GRANULOCYTES NFR BLD AUTO: 0.5 % — SIGNIFICANT CHANGE UP (ref 0–1.5)
INR BLD: 0.95 RATIO — SIGNIFICANT CHANGE UP (ref 0.88–1.16)
LYMPHOCYTES # BLD AUTO: 0.77 K/UL — LOW (ref 1–3.3)
LYMPHOCYTES # BLD AUTO: 11.7 % — LOW (ref 13–44)
MCHC RBC-ENTMCNC: 27.1 PG — SIGNIFICANT CHANGE UP (ref 27–34)
MCHC RBC-ENTMCNC: 34.1 GM/DL — SIGNIFICANT CHANGE UP (ref 32–36)
MCV RBC AUTO: 79.5 FL — LOW (ref 80–100)
MONOCYTES # BLD AUTO: 0.56 K/UL — SIGNIFICANT CHANGE UP (ref 0–0.9)
MONOCYTES NFR BLD AUTO: 8.5 % — SIGNIFICANT CHANGE UP (ref 2–14)
NEUTROPHILS # BLD AUTO: 5.17 K/UL — SIGNIFICANT CHANGE UP (ref 1.8–7.4)
NEUTROPHILS NFR BLD AUTO: 78.6 % — HIGH (ref 43–77)
NRBC # BLD: 0 /100 WBCS — SIGNIFICANT CHANGE UP (ref 0–0)
PLATELET # BLD AUTO: 113 K/UL — LOW (ref 150–400)
POTASSIUM SERPL-MCNC: 3.7 MMOL/L — SIGNIFICANT CHANGE UP (ref 3.5–5.3)
POTASSIUM SERPL-SCNC: 3.7 MMOL/L — SIGNIFICANT CHANGE UP (ref 3.5–5.3)
PROT SERPL-MCNC: 8.1 GM/DL — SIGNIFICANT CHANGE UP (ref 6–8.3)
PROTHROM AB SERPL-ACNC: 11 SEC — SIGNIFICANT CHANGE UP (ref 10.6–13.6)
RBC # BLD: 5.46 M/UL — HIGH (ref 3.8–5.2)
RBC # FLD: 15.9 % — HIGH (ref 10.3–14.5)
SARS-COV-2 RNA SPEC QL NAA+PROBE: SIGNIFICANT CHANGE UP
SODIUM SERPL-SCNC: 141 MMOL/L — SIGNIFICANT CHANGE UP (ref 135–145)
TROPONIN I SERPL-MCNC: 0.09 NG/ML — HIGH (ref 0.01–0.04)
WBC # BLD: 6.57 K/UL — SIGNIFICANT CHANGE UP (ref 3.8–10.5)
WBC # FLD AUTO: 6.57 K/UL — SIGNIFICANT CHANGE UP (ref 3.8–10.5)

## 2021-05-16 PROCEDURE — 70450 CT HEAD/BRAIN W/O DYE: CPT | Mod: 26

## 2021-05-16 PROCEDURE — 99285 EMERGENCY DEPT VISIT HI MDM: CPT

## 2021-05-16 PROCEDURE — 93010 ELECTROCARDIOGRAM REPORT: CPT

## 2021-05-16 PROCEDURE — 71045 X-RAY EXAM CHEST 1 VIEW: CPT | Mod: 26

## 2021-05-16 RX ORDER — ATORVASTATIN CALCIUM 80 MG/1
20 TABLET, FILM COATED ORAL AT BEDTIME
Refills: 0 | Status: DISCONTINUED | OUTPATIENT
Start: 2021-05-16 | End: 2021-05-18

## 2021-05-16 RX ORDER — SENNA PLUS 8.6 MG/1
2 TABLET ORAL AT BEDTIME
Refills: 0 | Status: DISCONTINUED | OUTPATIENT
Start: 2021-05-16 | End: 2021-05-18

## 2021-05-16 RX ORDER — ASPIRIN/CALCIUM CARB/MAGNESIUM 324 MG
81 TABLET ORAL DAILY
Refills: 0 | Status: DISCONTINUED | OUTPATIENT
Start: 2021-05-16 | End: 2021-05-18

## 2021-05-16 RX ORDER — FUROSEMIDE 40 MG
20 TABLET ORAL DAILY
Refills: 0 | Status: DISCONTINUED | OUTPATIENT
Start: 2021-05-16 | End: 2021-05-18

## 2021-05-16 RX ORDER — HYDRALAZINE HCL 50 MG
10 TABLET ORAL ONCE
Refills: 0 | Status: COMPLETED | OUTPATIENT
Start: 2021-05-16 | End: 2021-05-16

## 2021-05-16 RX ORDER — ATENOLOL 25 MG/1
50 TABLET ORAL DAILY
Refills: 0 | Status: DISCONTINUED | OUTPATIENT
Start: 2021-05-16 | End: 2021-05-18

## 2021-05-16 RX ORDER — MAGNESIUM HYDROXIDE 400 MG/1
30 TABLET, CHEWABLE ORAL DAILY
Refills: 0 | Status: DISCONTINUED | OUTPATIENT
Start: 2021-05-16 | End: 2021-05-18

## 2021-05-16 RX ORDER — HYDRALAZINE HCL 50 MG
50 TABLET ORAL EVERY 8 HOURS
Refills: 0 | Status: DISCONTINUED | OUTPATIENT
Start: 2021-05-16 | End: 2021-05-18

## 2021-05-16 RX ORDER — ENOXAPARIN SODIUM 100 MG/ML
40 INJECTION SUBCUTANEOUS DAILY
Refills: 0 | Status: DISCONTINUED | OUTPATIENT
Start: 2021-05-16 | End: 2021-05-18

## 2021-05-16 RX ADMIN — Medication 20 MILLIGRAM(S): at 18:42

## 2021-05-16 RX ADMIN — ENOXAPARIN SODIUM 40 MILLIGRAM(S): 100 INJECTION SUBCUTANEOUS at 18:42

## 2021-05-16 RX ADMIN — Medication 10 MILLIGRAM(S): at 15:05

## 2021-05-16 RX ADMIN — Medication 0.1 MILLIGRAM(S): at 15:04

## 2021-05-16 RX ADMIN — Medication 50 MILLIGRAM(S): at 23:00

## 2021-05-16 RX ADMIN — ATENOLOL 50 MILLIGRAM(S): 25 TABLET ORAL at 18:42

## 2021-05-16 RX ADMIN — ATORVASTATIN CALCIUM 20 MILLIGRAM(S): 80 TABLET, FILM COATED ORAL at 23:00

## 2021-05-16 NOTE — H&P ADULT - NSHPPHYSICALEXAM_GEN_ALL_CORE
General: A/ox 3, No acute Distress  skin : Normal  Head. Mane. No lacerations  Neck: Supple, NO JVD  Cardiac: S1 S2, No M/R/G  Pulmonary: CTAP, Breathing unlabored, No Rhonchi/Rales/Wheezing  Abdomen: Soft, Non -tender, +BS x 4 quads  Neuro: A/o x 3, No focal deficits. Normal Motor and sensory exam  Vascular: Normal distal pulses.  Extremities: . No rashes. No edema  Decubiti ; None

## 2021-05-16 NOTE — H&P ADULT - NSICDXPASTMEDICALHX_GEN_ALL_CORE_FT
PAST MEDICAL HISTORY:  Benign essential HTN     CAD (coronary artery disease)     Hypercholesteremia     Presence of stent in coronary artery

## 2021-05-16 NOTE — ED PROVIDER NOTE - OBJECTIVE STATEMENT
Pt is an 88 year old female w/PMH of HTN, HLD, who presents to the ED today for  lightheadedness, palpitation this morning. Pt states she had an elevated blood pressure today. Pt is compliant with her blood pressure medications. Pt seen on tuesday for with elevated cholestrol. Denies any CP Pt is an 88 year old female w/PMH of HTN, HLD, who presents to the ED today for lightheadedness and palpitations this morning. Pt states she had an elevated blood pressure today. Pt is compliant with her blood pressure medications and last took them this morning. Pt was seen on Tuesday by PMD for an elevated cholesterol. Denies any CP, body aches, coughs, neck pain, abdominal pain, back pain, flank pain, or SOB.

## 2021-05-16 NOTE — H&P ADULT - HISTORY OF PRESENT ILLNESS
Pt is an 88 year old female w/PMH of HTN, HLD, who presents to the ED today for lightheadedness and palpitations this morning. Pt states she had an elevated blood pressure today. Pt is compliant with her blood pressure medications and last took them this morning. Pt was seen on Tuesday by PMD for an elevated cholesterol. Denies any CP, body aches, coughs, neck pain, abdominal pain, back pain, flank pain, or SOB.   patient 's BP was controlled with catapres and Hydralazine in rhe ED

## 2021-05-16 NOTE — H&P ADULT - NSHPLABSRESULTS_GEN_ALL_CORE
14.8                 141  | 23   | 19           6.57  >-----------< 113     ------------------------< 100                   43.4                 3.7  | 107  | 1.52                                         Ca 9.3   Mg x     Ph x     CARDIAC MARKERS ( 16 May 2021 15:08 )  .092 ng/mL / x     / x     / x     / x        xray chest- no infiltrates    Ct head pending

## 2021-05-16 NOTE — ED ADULT TRIAGE NOTE - CHIEF COMPLAINT QUOTE
pt a&o x4, pt from home c.o of htn , palpitation and dizziness. headache  9/10. denies chest pain. HX htn took all medications this am.

## 2021-05-16 NOTE — H&P ADULT - NSICDXFAMILYHX_GEN_ALL_CORE_FT
FAMILY HISTORY:  Sibling  Still living? Unknown  Family history of hypertension in brother, Age at diagnosis: Age Unknown

## 2021-05-16 NOTE — ED PROVIDER NOTE - CLINICAL SUMMARY MEDICAL DECISION MAKING FREE TEXT BOX
Pt with Trop elevation -otherwise discussed with Dr. Wagner - may be factor of uncontrolled HTN - will evaluate - Dr. Lopez to admit for further evaluation of issue.

## 2021-05-17 ENCOUNTER — TRANSCRIPTION ENCOUNTER (OUTPATIENT)
Age: 86
End: 2021-05-17

## 2021-05-17 LAB
ANION GAP SERPL CALC-SCNC: 7 MMOL/L — SIGNIFICANT CHANGE UP (ref 5–17)
APPEARANCE UR: CLEAR — SIGNIFICANT CHANGE UP
BILIRUB UR-MCNC: NEGATIVE — SIGNIFICANT CHANGE UP
BUN SERPL-MCNC: 20 MG/DL — SIGNIFICANT CHANGE UP (ref 7–23)
CALCIUM SERPL-MCNC: 9.8 MG/DL — SIGNIFICANT CHANGE UP (ref 8.5–10.1)
CHLORIDE SERPL-SCNC: 106 MMOL/L — SIGNIFICANT CHANGE UP (ref 96–108)
CO2 SERPL-SCNC: 27 MMOL/L — SIGNIFICANT CHANGE UP (ref 22–31)
COLOR SPEC: YELLOW — SIGNIFICANT CHANGE UP
COVID-19 SPIKE DOMAIN AB INTERP: POSITIVE
COVID-19 SPIKE DOMAIN ANTIBODY RESULT: >250 U/ML — HIGH
CREAT SERPL-MCNC: 1.48 MG/DL — HIGH (ref 0.5–1.3)
DIFF PNL FLD: NEGATIVE — SIGNIFICANT CHANGE UP
GLUCOSE SERPL-MCNC: 91 MG/DL — SIGNIFICANT CHANGE UP (ref 70–99)
GLUCOSE UR QL: NEGATIVE MG/DL — SIGNIFICANT CHANGE UP
HCT VFR BLD CALC: 41.9 % — SIGNIFICANT CHANGE UP (ref 34.5–45)
HGB BLD-MCNC: 14.2 G/DL — SIGNIFICANT CHANGE UP (ref 11.5–15.5)
KETONES UR-MCNC: NEGATIVE — SIGNIFICANT CHANGE UP
LEUKOCYTE ESTERASE UR-ACNC: NEGATIVE — SIGNIFICANT CHANGE UP
MCHC RBC-ENTMCNC: 26.8 PG — LOW (ref 27–34)
MCHC RBC-ENTMCNC: 33.9 GM/DL — SIGNIFICANT CHANGE UP (ref 32–36)
MCV RBC AUTO: 79.1 FL — LOW (ref 80–100)
NITRITE UR-MCNC: NEGATIVE — SIGNIFICANT CHANGE UP
NRBC # BLD: 0 /100 WBCS — SIGNIFICANT CHANGE UP (ref 0–0)
PH UR: 6 — SIGNIFICANT CHANGE UP (ref 5–8)
PLATELET # BLD AUTO: 118 K/UL — LOW (ref 150–400)
POTASSIUM SERPL-MCNC: 3.7 MMOL/L — SIGNIFICANT CHANGE UP (ref 3.5–5.3)
POTASSIUM SERPL-SCNC: 3.7 MMOL/L — SIGNIFICANT CHANGE UP (ref 3.5–5.3)
PROT UR-MCNC: NEGATIVE MG/DL — SIGNIFICANT CHANGE UP
RBC # BLD: 5.3 M/UL — HIGH (ref 3.8–5.2)
RBC # FLD: 15.9 % — HIGH (ref 10.3–14.5)
SARS-COV-2 IGG+IGM SERPL QL IA: >250 U/ML — HIGH
SARS-COV-2 IGG+IGM SERPL QL IA: POSITIVE
SODIUM SERPL-SCNC: 140 MMOL/L — SIGNIFICANT CHANGE UP (ref 135–145)
SP GR SPEC: 1.01 — SIGNIFICANT CHANGE UP (ref 1.01–1.02)
TROPONIN I SERPL-MCNC: 0.09 NG/ML — HIGH (ref 0.01–0.04)
UROBILINOGEN FLD QL: NEGATIVE MG/DL — SIGNIFICANT CHANGE UP
WBC # BLD: 5.15 K/UL — SIGNIFICANT CHANGE UP (ref 3.8–10.5)
WBC # FLD AUTO: 5.15 K/UL — SIGNIFICANT CHANGE UP (ref 3.8–10.5)

## 2021-05-17 PROCEDURE — 99223 1ST HOSP IP/OBS HIGH 75: CPT

## 2021-05-17 RX ORDER — ASPIRIN/CALCIUM CARB/MAGNESIUM 324 MG
1 TABLET ORAL
Qty: 0 | Refills: 0 | DISCHARGE

## 2021-05-17 RX ORDER — ASPIRIN/CALCIUM CARB/MAGNESIUM 324 MG
1 TABLET ORAL
Qty: 0 | Refills: 0 | DISCHARGE
Start: 2021-05-17

## 2021-05-17 RX ADMIN — Medication 81 MILLIGRAM(S): at 11:43

## 2021-05-17 RX ADMIN — SENNA PLUS 2 TABLET(S): 8.6 TABLET ORAL at 21:49

## 2021-05-17 RX ADMIN — Medication 50 MILLIGRAM(S): at 06:10

## 2021-05-17 RX ADMIN — Medication 50 MILLIGRAM(S): at 21:50

## 2021-05-17 RX ADMIN — ENOXAPARIN SODIUM 40 MILLIGRAM(S): 100 INJECTION SUBCUTANEOUS at 11:43

## 2021-05-17 RX ADMIN — ATORVASTATIN CALCIUM 20 MILLIGRAM(S): 80 TABLET, FILM COATED ORAL at 21:49

## 2021-05-17 RX ADMIN — Medication 20 MILLIGRAM(S): at 06:10

## 2021-05-17 RX ADMIN — Medication 50 MILLIGRAM(S): at 15:11

## 2021-05-17 NOTE — PROGRESS NOTE ADULT - ASSESSMENT
uncontrolled HTn   improved.    troponins trending down- Demand ischemia.    NO cva.   stable for discharge

## 2021-05-17 NOTE — PROGRESS NOTE ADULT - SUBJECTIVE AND OBJECTIVE BOX
Patient is a 88y old  Female who presents with a chief complaint of dizziness/ uncontrolled HTN. HLD (17 May 2021 10:23)    improved. troponin trending down..    patient is stable/ HTn controlled.   INTERVAL HPI/OVERNIGHT EVENTS:  PAST MEDICAL & SURGICAL HISTORY:  Benign essential HTN    Hypercholesteremia    CAD (coronary artery disease)    Presence of stent in coronary artery    H/O small bowel obstruction        MEDICATIONS  (STANDING):  aspirin enteric coated 81 milliGRAM(s) Oral daily  ATENolol  Tablet 50 milliGRAM(s) Oral daily  atorvastatin 20 milliGRAM(s) Oral at bedtime  enoxaparin Injectable 40 milliGRAM(s) SubCutaneous daily  furosemide    Tablet 20 milliGRAM(s) Oral daily  hydrALAZINE 50 milliGRAM(s) Oral every 8 hours  senna 2 Tablet(s) Oral at bedtime    MEDICATIONS  (PRN):  magnesium hydroxide Suspension 30 milliLiter(s) Oral daily PRN Constipation      Allergies    No Known Allergies    Intolerances        REVIEW OF SYSTEMS:  CONSTITUTIONAL: No fever, weight loss, or fatigue  EYES: No eye pain, visual disturbances, or discharge  ENMT:  No difficulty hearing, tinnitus, vertigo; No sinus or throat pain  NECK: No pain or stiffness  BREASTS: No pain, masses, or nipple discharge  RESPIRATORY: No cough, wheezing, chills or hemoptysis; No shortness of breath  CARDIOVASCULAR: No chest pain, palpitations, dizziness, or leg swelling  GASTROINTESTINAL: No abdominal or epigastric pain. No nausea, vomiting, or hematemesis; No diarrhea or constipation. No melena or hematochezia.  GENITOURINARY: No dysuria, frequency, hematuria, or incontinence  NEUROLOGICAL: No headaches, memory loss, loss of strength, numbness, or tremors  SKIN: No itching, burning, rashes, or lesions   LYMPH NODES: No enlarged glands  ENDOCRINE: No heat or cold intolerance; No hair loss  MUSCULOSKELETAL: No joint pain or swelling; No muscle, back, or extremity pain  PSYCHIATRIC: No depression, anxiety, mood swings, or difficulty sleeping  HEME/LYMPH: No easy bruising, or bleeding gums  ALLERY AND IMMUNOLOGIC: No hives or eczema    Vital Signs Last 24 Hrs  T(C): 36.8 (16 May 2021 22:29), Max: 37.1 (16 May 2021 13:50)  T(F): 98.2 (16 May 2021 22:29), Max: 98.8 (16 May 2021 13:50)  HR: 48 (17 May 2021 07:40) (46 - 107)  BP: 160/74 (17 May 2021 05:57) (139/71 - 188/93)  BP(mean): --  RR: 18 (17 May 2021 05:57) (16 - 18)  SpO2: 98% (17 May 2021 05:57) (96% - 100%)    PHYSICAL EXAM:  GENERAL: NAD, well-groomed, well-developed  HEAD:  Atraumatic, Normocephalic  EYES: EOMI, PERRLA, conjunctiva and sclera clear  ENMT: No tonsillar erythema, exudates, or enlargement; Moist mucous membranes, Good dentition, No lesions  NECK: Supple, No JVD, Normal thyroid  NERVOUS SYSTEM:  Alert & Oriented X3, Good concentration; Motor Strength 5/5 B/L upper and lower extremities; DTRs 2+ intact and symmetric  CHEST/LUNG: Clear to percussion bilaterally; No rales, rhonchi, wheezing, or rubs  HEART: Regular rate and rhythm; No murmurs, rubs, or gallops  ABDOMEN: Soft, Nontender, Nondistended; Bowel sounds present  EXTREMITIES:  2+ Peripheral Pulses, No clubbing, cyanosis, or edema  LYMPH: No lymphadenopathy noted  SKIN: No rashes or lesions    LABS:                        14.2   5.15  )-----------( 118      ( 17 May 2021 06:46 )             41.9     05-    140  |  106  |  20  ----------------------------<  91  3.7   |  27  |  1.48<H>    Ca    9.8      17 May 2021 06:46    TPro  8.1  /  Alb  3.6  /  TBili  0.4  /  DBili  x   /  AST  29  /  ALT  27  /  AlkPhos  122<H>  05-16      PT/INR - ( 16 May 2021 15:09 )   PT: 11.0 sec;   INR: 0.95 ratio         PTT - ( 16 May 2021 15:09 )  PTT:24.0 sec  Urinalysis Basic - ( 17 May 2021 06:37 )    Color: Yellow / Appearance: Clear / S.010 / pH: x  Gluc: x / Ketone: Negative  / Bili: Negative / Urobili: Negative mg/dL   Blood: x / Protein: Negative mg/dL / Nitrite: Negative   Leuk Esterase: Negative / RBC: x / WBC x   Sq Epi: x / Non Sq Epi: x / Bacteria: x      CAPILLARY BLOOD GLUCOSE          CARDIAC MARKERS ( 17 May 2021 06:46 )  .087 ng/mL / x     / x     / x     / x      CARDIAC MARKERS ( 16 May 2021 15:08 )  .092 ng/mL / x     / x     / x     / x                RADIOLOGY & ADDITIONAL TESTS:    Imaging Personally Reviewed:  [ ] YES  [ ] NO    Consultant(s) Notes Reviewed:  [ ] YES  [ ] NO    Care Discussed with Consultants/Other Providers [ ] YES  [ ] NO    Care discussed with family,         [  ]   yes  [  ]  No    imp:    stable[ ]    unstable[  ]     improving [x   ]       unchanged  [  ]                Plans:  Continue present plans  [x  ]               New consult [  ]   specialty  .......               order test[  ]    test name.                  Discharge Planning  [  ]

## 2021-05-17 NOTE — DISCHARGE NOTE PROVIDER - CARE PROVIDER_API CALL
[facility])  Emergency Medicine; Internal Medicine  [address] [place] [address]  Phone: [phone]  Fax: [phone]  Follow Up Time: 1 month

## 2021-05-17 NOTE — DISCHARGE NOTE PROVIDER - NSDCMRMEDTOKEN_GEN_ALL_CORE_FT
Imdur:  orally   irbesartan:  orally   Lasix:  orally   Lopressor:  orally   NIFEdipine:  orally   ondansetron 4 mg oral tablet: 1 tab(s) orally every 8 hours, As Needed for nausea   aspirin 81 mg oral delayed release tablet: 1 tab(s) orally once a day  atenolol 50 mg oral tablet: 1 tab(s) orally once a day  Crestor 20 mg oral tablet: 1 tab(s) orally once a day  hydrALAZINE 25 mg oral tablet: 1 tab(s) orally 3 times a day  triamterene-hydrochlorothiazide 37.5 mg-25 mg oral capsule: 1 cap(s) orally once a day   aspirin 81 mg oral delayed release tablet: 1 tab(s) orally once a day  atenolol 25 mg oral tablet: 1 tab(s) orally once a day  Crestor 20 mg oral tablet: 1 tab(s) orally once a day  hydrALAZINE 25 mg oral tablet: 1 tab(s) orally 3 times a day  triamterene-hydrochlorothiazide 37.5 mg-25 mg oral capsule: 1 cap(s) orally once a day

## 2021-05-17 NOTE — CONSULT NOTE ADULT - SUBJECTIVE AND OBJECTIVE BOX
Cardiology Initial Consult    [facility] [place]  [address] [place] [address], [place] [address]  Office: [phone]  Fax: [phone]    CHIEF COMPLAINT:      HISTORY OF PRESENT ILLNESS:  88F HTN, HLD, CAD s/p stent mLAD 2010, who presented with lightheadedness and palpitations with elevated BP. Normal BP in the 140s on her home medications. No new medications, OTC's, sympathomimetics. Reports some anxiety when she experienced her palpitations.    Denies any active cardiac symptoms.    Trop-I found to be .092      Allergies  No Known Allergies  Intolerances    	  MEDICATIONS:  aspirin enteric coated 81 milliGRAM(s) Oral daily  ATENolol  Tablet 50 milliGRAM(s) Oral daily  enoxaparin Injectable 40 milliGRAM(s) SubCutaneous daily  furosemide    Tablet 20 milliGRAM(s) Oral daily  hydrALAZINE 50 milliGRAM(s) Oral every 8 hours  magnesium hydroxide Suspension 30 milliLiter(s) Oral daily PRN  senna 2 Tablet(s) Oral at bedtime  atorvastatin 20 milliGRAM(s) Oral at bedtime      PAST MEDICAL & SURGICAL HISTORY:  Benign essential HTN  Hypercholesteremia  CAD (coronary artery disease)  Presence of stent in coronary artery  H/O small bowel obstruction      FAMILY HISTORY:  Family history of hypertension in brother (Sibling)      SOCIAL HISTORY:    [x ] Non-smoker  [ ] Smoker  [ ] Alcohol    Review of Systems:  Constitutional: [- ] Fever [ -] Chills [- ] Fatigue [ ] Weight change   HEENT: [- ] Blurred vision [ -] Eye pain [ -] Headache [ ] Runny nose [ ] Sore throat   Respiratory: [- ] Cough [- ] Wheezing [ ] Shortness of breath  Cardiovascular: [- ] Chest Pain [x ] Palpitations [ ] TENORIO [ ] PND [ ] Orthopnea  Gastrointestinal: [- ] Abdominal Pain [- ] Diarrhea [- ] Constipation [ ] Hemorrhoids [ ] Nausea [ ] Vomiting  Genitourinary: [ ] Nocturia [- ] Dysuria [ ] Incontinence  Extremities: [- ] Swelling [ ] Joint Pain  Neurologic: [ ] Focal deficit [ ] Paresthesias [ -] Syncope  Skin: [- ] Rash [ ] Ecchymoses [ ] Wounds [ ] Lesions  Psychiatry: [ -] Depression [ ] Suicidal/Homicidal ideation [ ] Anxiety [ ] Sleep disturbances  [x ] 10 point review of systems is otherwise negative except as mentioned above            [ ]Unable to obtain    PHYSICAL EXAM:  T(C): 36.8 (05-16-21 @ 22:29), Max: 37.1 (05-16-21 @ 13:50)  HR: 48 (05-17-21 @ 07:40) (46 - 107)  BP: 160/74 (05-17-21 @ 05:57) (139/71 - 188/93)  RR: 18 (05-17-21 @ 05:57) (16 - 18)  SpO2: 98% (05-17-21 @ 05:57) (96% - 100%)  Wt(kg): --  I&O's Summary      Appearance: No acute distress  HEENT:   mmm  Cardiovascular: Normal S1 S2, no elevated JVP, soft systolic murmur RUSB, no edema  Respiratory: Lungs clear to auscultation	, good air movement  Psychiatry: A & O x 3, Mood & affect appropriate  Gastrointestinal:  soft nt nd normoactive bs	  Skin: No rashes, no ecchymoses, no cyanosis	  Neurologic: grossly non-focal  Extremities: Normal range of motion, no clubbing, cyanosis or edema  Vascular: Peripheral pulses palpable bilaterally    LABS:	 	  CBC Full  -  ( 17 May 2021 06:46 )  WBC Count : 5.15 K/uL  Hemoglobin : 14.2 g/dL  Hematocrit : 41.9 %  Platelet Count - Automated : 118 K/uL    05-17  140  |  106  |  20  ----------------------------<  91  3.7   |  27  |  1.48<H>    05-16  141  |  107  |  19  ----------------------------<  100<H>  3.7   |  23  |  1.52<H>    Ca    9.8      17 May 2021 06:46  Ca    9.3      16 May 2021 15:08    TPro  8.1  /  Alb  3.6  /  TBili  0.4  /  DBili  x   /  AST  29  /  ALT  27  /  AlkPhos  122<H>  05-16      CARDIAC MARKERS:  Troponin I, Serum: .087 ng/mL (05-17 @ 06:46)  Troponin I, Serum: .092 ng/mL (05-16 @ 15:08)    TELEMETRY: 	SR/SB, PAC's    ECG:  	  RADIOLOGY:  OTHER: 	    PREVIOUS DIAGNOSTIC TESTING:    [ ] Echocardiogram:   [ ] Catheterization:   [ ] Stress Test:

## 2021-05-17 NOTE — CONSULT NOTE ADULT - ASSESSMENT
88F HTN, HLD, CAD s/p stent mLAD 2010, who presented with lightheadedness and palpitations with elevated BP in the setting of known elevated systolic pressures.    -Troponin minimally elevated in the setting of elevated SCr  -Not ACS  -BP control  -can check a TTE but does not have to stay inpatient for this  -In light of occasional to sometimes frequent PAC's and symptomatic palpitations, would recommend continuing cardiac telemetry for the duration of her stay. If no arrhythmia, recommend outpatient cardiac event monitoring to r/o atrial fibrillation.      Manny Wagner MD, [facility]  , [facility] [employer] [facility] \[facility]\""/[facility]  [address] [place] [address], [place] [address]  office: [phone]

## 2021-05-17 NOTE — DISCHARGE NOTE PROVIDER - HOSPITAL COURSE
88 year old female w/PMH of HTN, HLD, who presents to the ED today for lightheadedness and palpitations this morning. Pt states she had an elevated blood pressure today. Pt is compliant with her blood pressure medications and last took them this morning. Pt was seen on Tuesday by PMD for an elevated cholesterol. Denies any CP, body aches, coughs, neck pain, abdominal pain, back pain, flank pain, or SOB.   patient 's BP was controlled with catapres and Hydralazine. Patient admitted with hypertension and elevated troponin at 0.092. Troponin downtrended to 0.087. Patient continued on atenlol, lasix, and hydralazine. Blood pressure controlled. Physical therapy consulted and recommended home with home PT.

## 2021-05-17 NOTE — PHYSICAL THERAPY INITIAL EVALUATION ADULT - LIVES WITH, PROFILE
Pt states she lives in pvt house with son, has 5 steps with rails to enter, about 8 steps with rail to basement.

## 2021-05-17 NOTE — PHYSICAL THERAPY INITIAL EVALUATION ADULT - GENERAL OBSERVATIONS, REHAB EVAL
Pt is alert, oriented x 4, follow directions, on room air, not in distress c/o feeling tired, weak and occassional dizziness during activities.

## 2021-05-17 NOTE — DISCHARGE NOTE PROVIDER - NSDCCPCAREPLAN_GEN_ALL_CORE_FT
PRINCIPAL DISCHARGE DIAGNOSIS  Diagnosis: Troponin level elevated  Assessment and Plan of Treatment: demand ischemia, downtrending. no chest pain. continue with apirin 81mg daily.      SECONDARY DISCHARGE DIAGNOSES  Diagnosis: Dizziness  Assessment and Plan of Treatment: resolved.    Diagnosis: CKD (chronic kidney disease), stage III  Assessment and Plan of Treatment: stable. at baseline.    Diagnosis: Hypertension  Assessment and Plan of Treatment: Please continue with atenolol 50mg daily, hydralazine 25mg three times a day, triamterine-hydrochlorothiazie 37.5-25mg once a day. Avoid high salt diet. Check blood pressures daily at home.

## 2021-05-17 NOTE — PHYSICAL THERAPY INITIAL EVALUATION ADULT - ADDITIONAL COMMENTS
Pt states she has a cane and a rolling walker at home but prior to this admission she does not use any walking device and is independent in ADLs

## 2021-05-18 ENCOUNTER — TRANSCRIPTION ENCOUNTER (OUTPATIENT)
Age: 86
End: 2021-05-18

## 2021-05-18 VITALS — SYSTOLIC BLOOD PRESSURE: 165 MMHG | DIASTOLIC BLOOD PRESSURE: 72 MMHG | HEART RATE: 55 BPM

## 2021-05-18 LAB
CULTURE RESULTS: SIGNIFICANT CHANGE UP
SPECIMEN SOURCE: SIGNIFICANT CHANGE UP

## 2021-05-18 RX ORDER — ATENOLOL 25 MG/1
1 TABLET ORAL
Qty: 30 | Refills: 0
Start: 2021-05-18 | End: 2021-06-16

## 2021-05-18 RX ORDER — ATENOLOL 25 MG/1
1 TABLET ORAL
Qty: 0 | Refills: 0 | DISCHARGE

## 2021-05-18 RX ORDER — HYDRALAZINE HCL 50 MG
1 TABLET ORAL
Qty: 90 | Refills: 0
Start: 2021-05-18 | End: 2021-06-16

## 2021-05-18 RX ORDER — ROSUVASTATIN CALCIUM 5 MG/1
1 TABLET ORAL
Qty: 0 | Refills: 0 | DISCHARGE

## 2021-05-18 RX ORDER — TRIAMTERENE/HYDROCHLOROTHIAZID 75 MG-50MG
1 TABLET ORAL
Qty: 0 | Refills: 0 | DISCHARGE

## 2021-05-18 RX ORDER — ASPIRIN/CALCIUM CARB/MAGNESIUM 324 MG
1 TABLET ORAL
Qty: 30 | Refills: 0
Start: 2021-05-18 | End: 2021-06-16

## 2021-05-18 RX ORDER — ROSUVASTATIN CALCIUM 5 MG/1
1 TABLET ORAL
Qty: 30 | Refills: 0
Start: 2021-05-18 | End: 2021-06-16

## 2021-05-18 RX ORDER — TRIAMTERENE/HYDROCHLOROTHIAZID 75 MG-50MG
1 TABLET ORAL
Qty: 30 | Refills: 0
Start: 2021-05-18 | End: 2021-06-16

## 2021-05-18 RX ORDER — HYDRALAZINE HCL 50 MG
1 TABLET ORAL
Qty: 0 | Refills: 0 | DISCHARGE

## 2021-05-18 RX ADMIN — Medication 81 MILLIGRAM(S): at 11:20

## 2021-05-18 RX ADMIN — Medication 20 MILLIGRAM(S): at 05:38

## 2021-05-18 RX ADMIN — Medication 50 MILLIGRAM(S): at 14:41

## 2021-05-18 RX ADMIN — Medication 50 MILLIGRAM(S): at 05:38

## 2021-05-18 RX ADMIN — ENOXAPARIN SODIUM 40 MILLIGRAM(S): 100 INJECTION SUBCUTANEOUS at 11:20

## 2021-05-18 NOTE — DISCHARGE NOTE NURSING/CASE MANAGEMENT/SOCIAL WORK - PATIENT PORTAL LINK FT
You can access the FollowMyHealth Patient Portal offered by [facility] by registering at the following website: [email or web address]. By joining [facility]’s FollowMyHealth portal, you will also be able to view your health information using other applications (apps) compatible with our system.

## 2021-05-25 DIAGNOSIS — Z95.5 PRESENCE OF CORONARY ANGIOPLASTY IMPLANT AND GRAFT: ICD-10-CM

## 2021-05-25 DIAGNOSIS — I12.9 HYPERTENSIVE CHRONIC KIDNEY DISEASE WITH STAGE 1 THROUGH STAGE 4 CHRONIC KIDNEY DISEASE, OR UNSPECIFIED CHRONIC KIDNEY DISEASE: ICD-10-CM

## 2021-05-25 DIAGNOSIS — N18.30 CHRONIC KIDNEY DISEASE, STAGE 3 UNSPECIFIED: ICD-10-CM

## 2021-05-25 DIAGNOSIS — I25.10 ATHEROSCLEROTIC HEART DISEASE OF NATIVE CORONARY ARTERY WITHOUT ANGINA PECTORIS: ICD-10-CM

## 2021-05-25 DIAGNOSIS — E78.5 HYPERLIPIDEMIA, UNSPECIFIED: ICD-10-CM

## 2021-05-25 DIAGNOSIS — R11.2 NAUSEA WITH VOMITING, UNSPECIFIED: ICD-10-CM

## 2021-05-25 DIAGNOSIS — D57.1 SICKLE-CELL DISEASE WITHOUT CRISIS: ICD-10-CM

## 2021-05-25 DIAGNOSIS — R77.8 OTHER SPECIFIED ABNORMALITIES OF PLASMA PROTEINS: ICD-10-CM

## 2021-05-25 DIAGNOSIS — E87.3 ALKALOSIS: ICD-10-CM

## 2021-05-25 DIAGNOSIS — F12.10 CANNABIS ABUSE, UNCOMPLICATED: ICD-10-CM

## 2021-05-25 DIAGNOSIS — J45.909 UNSPECIFIED ASTHMA, UNCOMPLICATED: ICD-10-CM

## 2021-05-25 DIAGNOSIS — E86.0 DEHYDRATION: ICD-10-CM

## 2021-05-25 DIAGNOSIS — I24.8 OTHER FORMS OF ACUTE ISCHEMIC HEART DISEASE: ICD-10-CM

## 2021-05-25 DIAGNOSIS — E87.6 HYPOKALEMIA: ICD-10-CM

## 2021-10-26 NOTE — ED CDU PROVIDER NOTE - NS ED MD DISPO DISCHARGE CCDA
Consent (Marginal Mandibular)/Introductory Paragraph: The rationale for Mohs was explained to the patient and consent was obtained. The risks, benefits and alternatives to therapy were discussed in detail. Specifically, the risks of damage to the marginal mandibular branch of the facial nerve, infection, scarring, bleeding, prolonged wound healing, incomplete removal, allergy to anesthesia, and recurrence were addressed. Prior to the procedure, the treatment site was clearly identified and confirmed by the patient. All components of Universal Protocol/PAUSE Rule completed. Patient/Caregiver provided printed discharge information.

## 2022-04-18 NOTE — ED ADULT NURSE NOTE - ISOLATION TYPE:
What Type Of Note Output Would You Prefer (Optional)?: Bullet Format Hpi Title: Evaluation of Skin Lesions None

## 2022-06-07 NOTE — ED ADULT NURSE NOTE - RESPIRATORY WDL
Called [facility] spoke to Nandini, gave her info needing complex spinal surgery and pt is a trauma pt. Faxed face sheet as instructed and Nandini said she will get back with me soon.  I called [facility] and [facility] and they do not accept any trauma pt.   Breathing spontaneous and unlabored. Breath sounds clear and equal bilaterally with regular rhythm.

## 2022-07-13 NOTE — CONSULT NOTE ADULT - CONSULT REQUESTED DATE/TIME
17-May-2021 10:23 Cibinqo Pregnancy And Lactation Text: It is unknown if this medication will adversely affect pregnancy or breast feeding.  You should not take this medication if you are currently pregnant or planning a pregnancy or while breastfeeding.

## 2022-12-22 NOTE — ED PROVIDER NOTE - RATE
PT DAILY TREATMENT NOTE    Patient Name: Eloisa Combs  Date:2022  : 1937  [x]  Patient  Verified  Payor: Lucrecia Ames / Plan: [place] PART A & B / Product Type: Medicare /    Total Treatment Time (min): 40  Total Treatment Time 1:1 (min): 30  Referring Provider: Ekaterina Weiss MD    Treatment Area: L foot    SUBJECTIVE  Subjective functional status/changes:   [] No changes reported  Got new [employer] shoes and has been very helpful with decreasing her foot pain    OBJECTIVE    Neuromuscular Re-education: 15 minutes one-on-one    Therapeutic exercises: 25 minutes, one-on-one 15    PT Exercise Log        Activity/Exercise Date  22    Activity/Exercise      Nustep L2 10 min. Slantboard (Therex)   X       Tandem stance with gait  belt X     Forward step overs      Lateral step overs  X   Balance board with gaitbelt   X     TB PF/DF (Therex)      Lateral Stepovers on foam X     Step Ups- Fwd and Lat      LP- 90# (Therex) X     Mini-squats X   SAQs 3LBS    SKTC     PPT      ASSESSMENT  []  See Plan of Care  []  See progress note/recertification  [x]  Patient will continue to benefit from skilled therapy to address remaining functional deficits: prolonged walking. Pain much better since getting new supportive footwear. She seems pleased, continued improvement with balance on uneven surfaces      ICD-10-CM ICD-9-CM    1. Difficulty walking  R26.2 719.7       2.  Unstable balance  R26.89 781.2         Progress towards goals / Updated goals:    PLAN  [x]  Upgrade activities as tolerated     [x]  Continue plan of care  []  Discharge due to:_  [] Other:_      Sherie Franklin, PT 2022  1:57 PM
56

## 2023-01-10 NOTE — ED ADULT NURSE NOTE - LOCATION
Last Clinic Visit: 5/18/2022 [facility] [place]     sternal region

## 2023-08-28 NOTE — ED PROVIDER NOTE - TEMPLATE, MLM
[address] [facility] Internal Medicine  Lakshmi Swanson MD; Eugenio Hemphill MD; Eyal Anderson MD; MD Gregory Metzger MD; MD BENJAMIN [facility]   [address] [facility]   [],   Consult follow up   [x],        Date:   8/24/2023  Patient name:  Sunday Guillen  Date of admission:  8/22/2023 10:34 PM  MRN:   [id number]  Account:  [de-identified]  YOB: 1974  PCP:    No primary care provider on file. Room:   [address] [place] [address]  Code Status:    Full Code    Physician Requesting Consult: Tamir Cardoza MD    Reason for Consult:  Manage Medical Comorbidities                                        --  Chief Complaint:     Chief Complaint   Patient presents with    Suicidal       History Obtained From:     EMR and Patient    History of Present Illness:     52year old presented to ED with depression anxiety and suicidal ideation . Active alcohol use . admitted to [facility] for further management.     Hospital Problems             Last Modified POA    * (Principal) MDD (major depressive disorder), recurrent, severe, with psychosis ([address]) 8/23/2023 Yes    Cannabis abuse 8/23/2023 Yes    Alcohol intoxication ([address]) 8/23/2023 Yes    Cocaine intoxication ([address]) 8/23/2023 Yes    Suicidal ideation 8/23/2023 Yes    Alcohol dependence with withdrawal, uncomplicated ([address]) 7/73/7207 Yes    Primary hypertension 8/23/2023 Yes    Type 2 diabetes mellitus, without long-term current use of insulin ([address]) 8/23/2023 Yes    Cocaine abuse ([address]) 8/23/2023 Yes    Alcohol abuse with withdrawal ([address]) 8/23/2023 Yes                     Past Medical History:     Past Medical History:   Diagnosis Date    Asthma     Cannabis abuse 11/20/2022    Generalized anxiety disorder with panic attacks 11/20/2022    Hypertension     MDD (major depressive disorder), recurrent, severe, with psychosis ([address]) 11/24/2020    Type II or unspecified type diabetes mellitus without Behavioral Services  Medicare Certification Upon Admission    I certify that this patient's inpatient psychiatric hospital admission is medically necessary for:    [x] (1) Treatment which could reasonably be expected to improve this patient's condition,       [x] (2) Or for diagnostic study;     AND     [x](2) The inpatient psychiatric services are provided while the individual is under the care of a physician and are included in the individualized plan of care.     Estimated length of stay/service 3-5 days    Plan for post-hospital care [facility]    Electronically signed by Gideon Amador MD on 8/23/2023 at 4:32 PM CLINICAL PHARMACY NOTE: MEDS TO BEDS    Total # of Prescriptions Filled: 8   The following medications were delivered to the patient:  Clonidine HCL 6.8UA  Folic Acid 1mg  Lisinopril 5mg  Metformin HCL 500mg  Multivitamin   Sertraline HCL 50mg  Trazodone HCL 50mg  Thiamine Mono 100mg    Additional Documentation:  Delivered medications to nurses station Daily Progress Note  8/24/2023    Patient Name: Amanda Alex    CHIEF COMPLAINT: Depression with suicidal ideation         SUBJECTIVE:      Patient is seen today for a follow up assessment. Staff reports the patient did come out of his room for breakfast this morning, they report that he has complained of some diarrhea. His tremors are starting to improve. He remains on a scheduled Librium regimen for alcohol withdrawal.  He is interviewed today bedside and reports that his tremors are beginning to improve. He reports increased anxiety largely because he does not have access to alcohol. He continues to express depression, his suicidal thoughts are beginning to improve. He still does not feel that he is able to contract for safety in the community, he reports much of his distress is related to \"situations at home\". He does verbalize understanding that at some point we will begin a Librium taper. Appetite:  [x] Adequate/Unchanged  [] Increased  [] Decreased      Sleep:       [x] Adequate/Unchanged  [] Fair  [] Poor      Group Attendance on Unit:   [] Yes   [] Selectively    [x] No    Compliant with scheduled medications: [x] Yes  [] No    Received emergency medications in past 24 hrs: [] Yes   [x] No    Medication Side Effects: Denies         Mental Status Exam  Level of consciousness: Alert and awake   Appearance: Appropriate attire for setting, resting in bed, with fair  grooming and hygiene   Behavior/Motor: Approachable, engages with interviewer, mild tremors noted attitude toward examiner: Cooperative, attentive, intermittent eye contact  Speech: Normal rate, volume and tone  Mood: \"Anxious\"  Affect: Congruent, remains largely isolative  Thought processes: Linear and coherent  Thought content:  Denies homicidal ideation  Suicidal Ideation: Reports improvement in suicidal ideations, contracts for safety on the unit. Delusions: No evidence of delusions.    Perceptual Disturbance: Reports Daily Progress Note  8/26/2023    Patient Name: Mali aBez    CHIEF COMPLAINT: Depression with suicidal ideation         SUBJECTIVE:      Patient is seen today for a follow up assessment. He accepted the need for privacy, interview conducted in [employer]. He reports improvement of mood, he feels depressive symptoms are slowly improving and having less suicidal ideation. At this time he is not able to contract for safety off the unit. He denies feeling significant level of anxiety. Denies new symptoms. Denies homicidal ideation. Denies auditory, visual or other perceptual disturbances. He remains compliant with taking scheduled medications. He is tolerating increased dose of Zoloft and denies medication adverse effects. No need for emergency medication for the past 24 hours. Appetite:  [x] Adequate/Unchanged  [] Increased  [] Decreased      Sleep:       [x] Adequate/Unchanged  [] Fair  [] Poor      Group Attendance on Unit:   [x] Yes   [] Selectively    [] No    Compliant with scheduled medications: [x] Yes  [] No    Received emergency medications in past 24 hrs: [] Yes   [x] No    Medication Side Effects: Denies         Mental Status Exam  Level of consciousness: Alert and awake   Appearance: Appropriate attire for setting, seated in chair, with fair  grooming and hygiene   Behavior/Motor: Approachable, engages with interviewer, no psychomotor abnormalities   Attitude toward examiner: Cooperative, attentive, fair eye contact  Speech: Normal rate, volume, and tone. Mood:  \"depressed\"  Affect: Mood congruent  Thought processes: linear and coherent   Thought content: Denies homicidal ideation  Suicidal Ideation: Reports improvement in suicidal ideations, contracts for safety on the unit. Delusions: No evidence of delusions. Perceptual Disturbance: Patient does not appear to be responding to internal stimuli.    Cognition: Oriented to self, location, time, and situation  Memory: intact  Insight: Daily Progress Note  8/27/2023    Patient Name: Maurizio Montalvo    CHIEF COMPLAINT: Depression with suicidal ideation         SUBJECTIVE:      Patient is seen today for a follow up assessment. He declined need for privacy, interview conducted in common space. He reports feeling less depressed and having fleeting suicidal ideation. He denies feeling anxious. Denies new symptoms. Denies homicidal ideation. Denies auditory, visual or other perceptual disturbances. He remains compliant with taking scheduled medications and denies adverse effects. No need for emergency medication for the past 24 hours. Per unit nursing staff patient maintains behavioral control, he frequents the milieu socializing with other patients. Patient is not interested in an AOD inpatient treatment program.  He wants to be linked with [employer]. At this time patient is not able to contract for safety off the unit. Appetite:  [x] Adequate/Unchanged  [] Increased  [] Decreased      Sleep:       [x] Adequate/Unchanged  [] Fair  [] Poor      Group Attendance on Unit:   [x] Yes   [] Selectively    [] No    Compliant with scheduled medications: [x] Yes  [] No    Received emergency medications in past 24 hrs: [] Yes   [x] No    Medication Side Effects: Denies         Mental Status Exam  Level of consciousness: Alert and awake   Appearance: Appropriate attire for setting, seated in chair, with fair  grooming and hygiene   Behavior/Motor: Approachable, engages with interviewer, no psychomotor abnormalities   Attitude toward examiner: Cooperative, attentive, fair eye contact  Speech: Normal rate, volume, and tone. Mood:  \"depressed\"  Affect: blunted  Thought processes: linear and coherent   Thought content: Denies homicidal ideation  Suicidal Ideation: Reports fleeting suicidal ideation, contracts for safety on the unit. Delusions: No evidence of delusions.    Perceptual Disturbance: Patient does not appear to be responding to RT ASSESSMENT TREATMENT GOALS    [x]Pt Goal:  Pt will identify 1-2 positive coping skills by time of discharge. []Pt Goal:  Pt will identify 1-2 positive aspects of self by time of discharge. []Pt Goal:  Pt will remain on task/topic for 15-30 minutes during group by time of discharge. [x]Pt Goal:  Pt will identify 1-2 aspects of relapse prevention plan by time of discharge. []Pt Goal:  Pt will join in conversation with peers 1-2 times per group by time of discharge. []Pt Goal:  Pt will identify 1-2 new leisure interests by time of discharge. []Pt Goal:  Pt will not voice any delusional content by time of discharge. on 8/23/2023 11/15/21   Debra Pozo MD   insulin lispro (HUMALOG) 100 UNIT/ML injection vial Inject 0-3 Units into the skin nightly  Patient not taking: Reported on 8/23/2023 11/15/21   Debra Pozo MD   insulin lispro (HUMALOG) 100 UNIT/ML injection vial Inject 0-6 Units into the skin 3 times daily (with meals)  Patient not taking: Reported on 8/23/2023 11/15/21   Debra Pozo MD   Multiple Vitamins-Minerals (THERAPEUTIC MULTIVITAMIN-MINERALS) tablet Take 1 tablet by mouth daily  Patient not taking: Reported on 8/23/2023 11/15/21   Debra Pozo MD   nicotine polacrilex (NICORETTE) 2 MG gum Take 1 each by mouth every 2 hours as needed for Smoking cessation  Patient not taking: Reported on 8/23/2023 11/15/21   Debra Pozo MD   FreeStyle Lancets MISC Check blood sugar once/day  Patient not taking: Reported on 8/23/2023 11/15/21   Debra Pozo MD   Insulin Syringe-Needle U-100 30G X 1/2\" 0.5 ML MISC 1 each by Does not apply route daily  Patient not taking: Reported on 8/23/2023 11/15/21   Debra Pozo MD         Please let me know if you have any questions about this encounter. Thank you!     Electronically signed by [facility] on 8/23/2023 at 11:35 AM ng/mL)                  Methadone Screen, Urine 08/22/2023 NEGATIVE  NEGATIVE Final    Comment:       (Positive cutoff 300 ng/mL)                  Opiates, Urine 08/22/2023 NEGATIVE  NEGATIVE Final    Comment:       (Positive cutoff 300 ng/mL)                  Phencyclidine, Urine 08/22/2023 NEGATIVE  NEGATIVE Final    Comment:       (Positive cutoff 25 ng/mL)                  Cannabinoid Scrn, Ur 08/22/2023 POSITIVE (A)  NEGATIVE Final    Comment:       (Positive cutoff 50 ng/mL)                  Oxycodone Screen, Ur 08/22/2023 NEGATIVE  NEGATIVE Final    Comment:       (Positive cutoff 100 ng/mL)                  Fentanyl, Ur 08/22/2023 NEGATIVE  NEGATIVE Final    Comment:       (Positive cutoff  5 ng/ml)            Test Information 08/22/2023 Assay provides medical screening only. The absence of expected drug(s) and/or metabolite(s) may indicate diluted or adulterated urine, limitations of testing or timing of collection. Final    Comment: Testing for legal purposes should be confirmed by another method. To request confirmation   of test result, please call the lab within 7 days of sample submission.       Color, UA 08/22/2023 Yellow  Yellow Final    Turbidity UA 08/22/2023 Clear  Clear Final    Glucose, Ur 08/22/2023 NEGATIVE  NEGATIVE mg/dL Final    Bilirubin Urine 08/22/2023 NEGATIVE  NEGATIVE Final    Ketones, Urine 08/22/2023 TRACE (A)  NEGATIVE mg/dL Final    Specific Gravity, UA 08/22/2023 1.012  1.000 - 1.030 Final    Urine Hgb 08/22/2023 TRACE (A)  NEGATIVE Final    pH, UA 08/22/2023 5.0  5.0 - 8.0 Final    Protein, UA 08/22/2023 2+ (A)  NEGATIVE mg/dL Final    Urobilinogen, Urine 08/22/2023 Normal  0.0 - 1.0 EU/dL Final    Nitrite, Urine 08/22/2023 NEGATIVE  NEGATIVE Final    Leukocyte Esterase, Urine 08/22/2023 TRACE (A)  NEGATIVE Final    WBC, UA 08/22/2023 0 TO 2 (A)  0 TO 5 /HPF Final    RBC, UA 08/22/2023 0 TO 2  0 TO 2 /HPF Final    Casts UA 08/22/2023 3 to 5 (A)  None /LPF Final    Epithelial POA    * (Principal) MDD (major depressive disorder), recurrent, severe, with psychosis ([address]) 8/23/2023 Yes    Cannabis abuse 8/23/2023 Yes    Alcohol intoxication ([address]) 8/23/2023 Yes    Cocaine intoxication ([address]) 8/23/2023 Yes    Suicidal ideation 8/23/2023 Yes    Alcohol dependence with withdrawal, uncomplicated ([address]) 4/91/9052 Yes    Primary hypertension 8/23/2023 Yes    Type 2 diabetes mellitus, without long-term current use of insulin ([address]) 8/23/2023 Yes    Cocaine abuse ([address]) 8/23/2023 Yes    Alcohol abuse with withdrawal ([address]) 8/23/2023 Yes     Plan:     Hospital Problems             Last Modified POA    * (Principal) MDD (major depressive disorder), recurrent, severe, with psychosis ([address]) 8/23/2023 Yes    Cannabis abuse 8/23/2023 Yes    Alcohol intoxication ([address]) 8/23/2023 Yes    Cocaine intoxication ([address]) 8/23/2023 Yes    Suicidal ideation 8/23/2023 Yes    Alcohol dependence with withdrawal, uncomplicated ([address]) 4/92/7112 Yes    Primary hypertension 8/23/2023 Yes    Type 2 diabetes mellitus, without long-term current use of insulin ([address]) 8/23/2023 Yes    Cocaine abuse ([address]) 8/23/2023 Yes    Alcohol abuse with withdrawal ([address]) 8/23/2023 Yes     Uncontrolled Hypertension start lisinopril   Type 2 Diabetes Mellitus  Asthma/COPD  Chronic Smoker/alcohol use . Polysubstance use   Alcohol withdrawal    -Vitals signs reviewed. Tachycardia , Blood pressures remain consistently elevated likely due to alcohol withdrawal and underlying HTN . -Will start the patient on clonidine 0.1 mg twice daily. Will increase as needed.   -Continue thiamine and folate supplementation.  -Will resume home metformin for T2DM. Will recheck HBa1c .   -Cbc cmp and uds reviewed. Uds positive for cannabinoids and cocaine.   -Mild elevation in LFT likely due to alcohol use . -Check Hba1c , TSH and lipid panel .  -will continue to follow.              Consultations:   IP CONSULT TO INTERNAL MEDICINE      [facility], Neuro

## 2025-02-14 ENCOUNTER — INPATIENT (INPATIENT)
Facility: HOSPITAL | Age: 89
LOS: 3 days | Discharge: HOME | End: 2025-02-18
Attending: STUDENT IN AN ORGANIZED HEALTH CARE EDUCATION/TRAINING PROGRAM | Admitting: STUDENT IN AN ORGANIZED HEALTH CARE EDUCATION/TRAINING PROGRAM
Payer: MEDICARE

## 2025-02-14 VITALS
RESPIRATION RATE: 18 BRPM | HEART RATE: 109 BPM | OXYGEN SATURATION: 95 % | SYSTOLIC BLOOD PRESSURE: 147 MMHG | TEMPERATURE: 103 F | DIASTOLIC BLOOD PRESSURE: 71 MMHG

## 2025-02-14 DIAGNOSIS — Z87.19 PERSONAL HISTORY OF OTHER DISEASES OF THE DIGESTIVE SYSTEM: Chronic | ICD-10-CM

## 2025-02-14 PROCEDURE — 99285 EMERGENCY DEPT VISIT HI MDM: CPT

## 2025-02-14 RX ORDER — IBUPROFEN 200 MG
600 TABLET ORAL ONCE
Refills: 0 | Status: COMPLETED | OUTPATIENT
Start: 2025-02-14 | End: 2025-02-14

## 2025-02-14 RX ORDER — ACETAMINOPHEN 500 MG/5ML
650 LIQUID (ML) ORAL ONCE
Refills: 0 | Status: COMPLETED | OUTPATIENT
Start: 2025-02-14 | End: 2025-02-14

## 2025-02-14 NOTE — ED ADULT TRIAGE NOTE - ESI TRIAGE ACUITY LEVEL, MLM
Pt sustaining HR in low 40's. Tele in place. MD notified via [employer]; awaiting reply.  Electronically signed by Nile Leonardo RN on 11/27/2022 at 8:13 AM 3

## 2025-02-14 NOTE — ED ADULT TRIAGE NOTE - CHIEF COMPLAINT QUOTE
pt c/o fevers  and abd pain with nausea x1 day. Pt noted to be febrile in triage. Pt hx HTN, HLD, Pt denies SOB, chest pain, diarrhea, headache.

## 2025-02-14 NOTE — ED PROVIDER NOTE - CLINICAL SUMMARY MEDICAL DECISION MAKING FREE TEXT BOX
Sam: F, chills, V (no blood). No  Sx. No other GI Sx. No cough/SOB. Likely viral syndrome. Check CBC and RVP. IVF. Antipyretic meds. Sam: F, chills, V (no blood). No  Sx. No other GI Sx. No cough/SOB. Likely viral syndrome. Check CBC and RVP. IVF. Antipyretic meds.    Alejandra PGY3:  89 yo F w/ PMHx of HTN, HLD, and SBO (2010) presents for a few days of body soreness/chills and 1 day of nausea/abdominal pain/fevers/vomiting (1 episode, nonbloody).  Patient denies any sick contacts, and any cough/sore throat.  She denies any CP/SOB, abdominal pain, dysuria/hematuria, diarrhea/constipation.  She took acetaminophen ~4 PM today with only slight improvement in symptoms.  She denies any history of smoking/alcohol use.  She is compliant with all medications including multiple antihypertensives.  She is no longer nauseous at the time, but only reports slight abdominal soreness.    Vital signs remarkable for  and T102F.  Physical exam is only remarkable for early systolic murmur (right second intercostal space).  Otherwise, clear lung sounds (in all fields) no abdominal TTP, no leg swelling, no CVA TTP.  Concern for gastroenteritis versus URI.  Low concern for SBO (normal bowel movements), sepsis, or pneumonia (clear lung sounds in all lung fields).  Plan for labs, flu with COVID swab, and symptomatic treatment with acetaminophen/ibuprofen.  Will refer patient to outpatient cardiologist for evaluation of murmur. Sam: F, chills, V (no blood). No  Sx. No other GI Sx. No cough/SOB. Likely viral syndrome. Check CBC and RVP. IVF. Antipyretic meds.    Alejandra PGY3:  89 yo F w/ PMHx of HTN, HLD, and SBO (2010) presents for a few days of body soreness/chills and 1 day of nausea/abdominal pain/fevers/vomiting (1 episode, nonbloody).  Patient denies any sick contacts, and any cough/sore throat.  She denies any CP/SOB, abdominal pain, dysuria/hematuria, diarrhea/constipation.  She took acetaminophen ~4 PM today with only slight improvement in symptoms.  She denies any history of smoking/alcohol use.  She is compliant with all medications including multiple antihypertensives.  She is no longer nauseous at the time, but only reports slight abdominal soreness.    Vital signs remarkable for  and T102F.  Physical exam is only remarkable for early systolic murmur (right second intercostal space).  Otherwise, clear lung sounds (in all fields) no abdominal TTP, no leg swelling, no CVA TTP.  Concern for gastroenteritis versus URI.  Low concern for SBO (normal bowel movements), sepsis, or pneumonia (clear lung sounds in all lung fields).  Plan for labs, flu with COVID swab, and symptomatic treatment with acetaminophen/ibuprofen. Since patient is 89-years-old with non-specific symptoms meeting sepsis criteria will order sepsis protocol with CXR/CT abdomen/pelvis, and troponins/EKG.  Will refer patient to outpatient cardiologist for evaluation of murmur.

## 2025-02-14 NOTE — ED PROVIDER NOTE - PROGRESS NOTE DETAILS
Alejandra PGY3: Labs remarkable for UTI and PEDRO PABLO.  CT abdomen/pelvis w/ no contrast shows no evidence of kidney stone, but does shows bilateral renal cyst.  Considering patient's high fevers and full body aches in setting of UTI and PEDRO PABLO, admitted to medicine for concern for sepsis.  Patient given 2 L of fluid and states ~30 cc per kg.  Admitted to medicine for sepsis workup.  CXR expedited x 2, but still pending.

## 2025-02-14 NOTE — ED ADULT NURSE NOTE - OBJECTIVE STATEMENT
89 yo female Pt present to the ED with c/o generalized body aches x 2days. Pt is alert and oriented x4. Pt denies chest pain,  abdominal pain, N/V/D, h/a, dizziness, numbness/tingling or any urinary symptoms at this time. Pt reports  fever and chill.  Pt made comfortable. All safety precautions maintained.

## 2025-02-14 NOTE — ED PROVIDER NOTE - CARE PLAN
Principal Discharge DX:	Viral syndrome   1 Principal Discharge DX:	Acute UTI  Secondary Diagnosis:	Sepsis, unspecified organism

## 2025-02-14 NOTE — ED PROVIDER NOTE - ATTENDING CONTRIBUTION TO CARE
I performed a face-to-face evaluation of the patient and performed a history and physical examination. I agree with the history and physical examination. If this was a PA visit, I personally saw the patient with the PA and performed a substantive portion of the visit including all aspects of the medical decision making.    Sam: F, chills, V (no blood). No  Sx. No other GI Sx. No cough/SOB. Likely viral syndrome. Check CBC and RVP. IVF. Antipyretic meds.

## 2025-02-15 DIAGNOSIS — A41.9 SEPSIS, UNSPECIFIED ORGANISM: ICD-10-CM

## 2025-02-15 DIAGNOSIS — Z29.9 ENCOUNTER FOR PROPHYLACTIC MEASURES, UNSPECIFIED: ICD-10-CM

## 2025-02-15 DIAGNOSIS — N17.9 ACUTE KIDNEY FAILURE, UNSPECIFIED: ICD-10-CM

## 2025-02-15 DIAGNOSIS — E78.5 HYPERLIPIDEMIA, UNSPECIFIED: ICD-10-CM

## 2025-02-15 DIAGNOSIS — D69.6 THROMBOCYTOPENIA, UNSPECIFIED: ICD-10-CM

## 2025-02-15 DIAGNOSIS — I25.10 ATHEROSCLEROTIC HEART DISEASE OF NATIVE CORONARY ARTERY WITHOUT ANGINA PECTORIS: ICD-10-CM

## 2025-02-15 DIAGNOSIS — I10 ESSENTIAL (PRIMARY) HYPERTENSION: ICD-10-CM

## 2025-02-15 DIAGNOSIS — B34.9 VIRAL INFECTION, UNSPECIFIED: ICD-10-CM

## 2025-02-15 LAB
ALBUMIN SERPL ELPH-MCNC: 3.2 G/DL — LOW (ref 3.3–5)
ALBUMIN SERPL ELPH-MCNC: 3.3 G/DL — SIGNIFICANT CHANGE UP (ref 3.3–5)
ALP SERPL-CCNC: 107 U/L — SIGNIFICANT CHANGE UP (ref 40–120)
ALP SERPL-CCNC: 110 U/L — SIGNIFICANT CHANGE UP (ref 40–120)
ALT FLD-CCNC: 13 U/L — SIGNIFICANT CHANGE UP (ref 4–33)
ALT FLD-CCNC: 16 U/L — SIGNIFICANT CHANGE UP (ref 4–33)
ANION GAP SERPL CALC-SCNC: 17 MMOL/L — HIGH (ref 7–14)
ANION GAP SERPL CALC-SCNC: 18 MMOL/L — HIGH (ref 7–14)
APPEARANCE UR: ABNORMAL
AST SERPL-CCNC: 32 U/L — SIGNIFICANT CHANGE UP (ref 4–32)
AST SERPL-CCNC: 43 U/L — HIGH (ref 4–32)
BACTERIA # UR AUTO: ABNORMAL /HPF
BASOPHILS # BLD AUTO: 0 K/UL — SIGNIFICANT CHANGE UP (ref 0–0.2)
BASOPHILS # BLD AUTO: 0.03 K/UL — SIGNIFICANT CHANGE UP (ref 0–0.2)
BASOPHILS NFR BLD AUTO: 0 % — SIGNIFICANT CHANGE UP (ref 0–2)
BASOPHILS NFR BLD AUTO: 0.3 % — SIGNIFICANT CHANGE UP (ref 0–2)
BILIRUB SERPL-MCNC: 0.6 MG/DL — SIGNIFICANT CHANGE UP (ref 0.2–1.2)
BILIRUB SERPL-MCNC: 0.8 MG/DL — SIGNIFICANT CHANGE UP (ref 0.2–1.2)
BILIRUB UR-MCNC: NEGATIVE — SIGNIFICANT CHANGE UP
BLOOD GAS VENOUS COMPREHENSIVE RESULT: SIGNIFICANT CHANGE UP
BUN SERPL-MCNC: 31 MG/DL — HIGH (ref 7–23)
BUN SERPL-MCNC: 34 MG/DL — HIGH (ref 7–23)
CALCIUM SERPL-MCNC: 8.2 MG/DL — LOW (ref 8.4–10.5)
CALCIUM SERPL-MCNC: 8.7 MG/DL — SIGNIFICANT CHANGE UP (ref 8.4–10.5)
CAST: 0 /LPF — SIGNIFICANT CHANGE UP (ref 0–4)
CHLORIDE SERPL-SCNC: 103 MMOL/L — SIGNIFICANT CHANGE UP (ref 98–107)
CHLORIDE SERPL-SCNC: 104 MMOL/L — SIGNIFICANT CHANGE UP (ref 98–107)
CO2 SERPL-SCNC: 16 MMOL/L — LOW (ref 22–31)
CO2 SERPL-SCNC: 17 MMOL/L — LOW (ref 22–31)
COLOR SPEC: YELLOW — SIGNIFICANT CHANGE UP
CREAT SERPL-MCNC: 1.91 MG/DL — HIGH (ref 0.5–1.3)
CREAT SERPL-MCNC: 2.16 MG/DL — HIGH (ref 0.5–1.3)
DIFF PNL FLD: ABNORMAL
E COLI DNA BLD POS QL NAA+NON-PROBE: SIGNIFICANT CHANGE UP
EGFR: 21 ML/MIN/1.73M2 — LOW
EGFR: 24 ML/MIN/1.73M2 — LOW
EOSINOPHIL # BLD AUTO: 0 K/UL — SIGNIFICANT CHANGE UP (ref 0–0.5)
EOSINOPHIL # BLD AUTO: 0.04 K/UL — SIGNIFICANT CHANGE UP (ref 0–0.5)
EOSINOPHIL NFR BLD AUTO: 0 % — SIGNIFICANT CHANGE UP (ref 0–6)
EOSINOPHIL NFR BLD AUTO: 0.4 % — SIGNIFICANT CHANGE UP (ref 0–6)
FLUAV AG NPH QL: SIGNIFICANT CHANGE UP
FLUBV AG NPH QL: SIGNIFICANT CHANGE UP
GLUCOSE SERPL-MCNC: 100 MG/DL — HIGH (ref 70–99)
GLUCOSE SERPL-MCNC: 107 MG/DL — HIGH (ref 70–99)
GLUCOSE UR QL: NEGATIVE MG/DL — SIGNIFICANT CHANGE UP
GRAM STN FLD: ABNORMAL
GRAM STN FLD: ABNORMAL
HCT VFR BLD CALC: 38 % — SIGNIFICANT CHANGE UP (ref 34.5–45)
HCT VFR BLD CALC: 39.1 % — SIGNIFICANT CHANGE UP (ref 34.5–45)
HGB BLD-MCNC: 13.4 G/DL — SIGNIFICANT CHANGE UP (ref 11.5–15.5)
HGB BLD-MCNC: 13.7 G/DL — SIGNIFICANT CHANGE UP (ref 11.5–15.5)
IANC: 8.28 K/UL — HIGH (ref 1.8–7.4)
IANC: 9.84 K/UL — HIGH (ref 1.8–7.4)
IMM GRANULOCYTES NFR BLD AUTO: 1.4 % — HIGH (ref 0–0.9)
KETONES UR-MCNC: NEGATIVE MG/DL — SIGNIFICANT CHANGE UP
LACTATE SERPL-SCNC: 1.6 MMOL/L — SIGNIFICANT CHANGE UP (ref 0.5–2)
LEUKOCYTE ESTERASE UR-ACNC: ABNORMAL
LYMPHOCYTES # BLD AUTO: 0.51 K/UL — LOW (ref 1–3.3)
LYMPHOCYTES # BLD AUTO: 1.09 K/UL — SIGNIFICANT CHANGE UP (ref 1–3.3)
LYMPHOCYTES # BLD AUTO: 10.4 % — LOW (ref 13–44)
LYMPHOCYTES # BLD AUTO: 4.4 % — LOW (ref 13–44)
MAGNESIUM SERPL-MCNC: 1.4 MG/DL — LOW (ref 1.6–2.6)
MAGNESIUM SERPL-MCNC: 5.5 MG/DL — HIGH (ref 1.6–2.6)
MCHC RBC-ENTMCNC: 27.8 PG — SIGNIFICANT CHANGE UP (ref 27–34)
MCHC RBC-ENTMCNC: 28.1 PG — SIGNIFICANT CHANGE UP (ref 27–34)
MCHC RBC-ENTMCNC: 35 G/DL — SIGNIFICANT CHANGE UP (ref 32–36)
MCHC RBC-ENTMCNC: 35.3 G/DL — SIGNIFICANT CHANGE UP (ref 32–36)
MCV RBC AUTO: 79.3 FL — LOW (ref 80–100)
MCV RBC AUTO: 79.7 FL — LOW (ref 80–100)
METHOD TYPE: SIGNIFICANT CHANGE UP
MONOCYTES # BLD AUTO: 0.91 K/UL — HIGH (ref 0–0.9)
MONOCYTES # BLD AUTO: 0.94 K/UL — HIGH (ref 0–0.9)
MONOCYTES NFR BLD AUTO: 7.9 % — SIGNIFICANT CHANGE UP (ref 2–14)
MONOCYTES NFR BLD AUTO: 8.9 % — SIGNIFICANT CHANGE UP (ref 2–14)
MRSA PCR RESULT.: SIGNIFICANT CHANGE UP
NEUTROPHILS # BLD AUTO: 8.28 K/UL — HIGH (ref 1.8–7.4)
NEUTROPHILS # BLD AUTO: 9.98 K/UL — HIGH (ref 1.8–7.4)
NEUTROPHILS NFR BLD AUTO: 78.6 % — HIGH (ref 43–77)
NEUTROPHILS NFR BLD AUTO: 78.9 % — HIGH (ref 43–77)
NITRITE UR-MCNC: POSITIVE
NRBC # BLD AUTO: 0 K/UL — SIGNIFICANT CHANGE UP (ref 0–0)
NRBC # FLD: 0 K/UL — SIGNIFICANT CHANGE UP (ref 0–0)
NRBC BLD AUTO-RTO: 0 /100 WBCS — SIGNIFICANT CHANGE UP (ref 0–0)
PH UR: 6 — SIGNIFICANT CHANGE UP (ref 5–8)
PHOSPHATE SERPL-MCNC: 1.9 MG/DL — LOW (ref 2.5–4.5)
PHOSPHATE SERPL-MCNC: 3.8 MG/DL — SIGNIFICANT CHANGE UP (ref 2.5–4.5)
PLATELET # BLD AUTO: 82 K/UL — LOW (ref 150–400)
PLATELET # BLD AUTO: 85 K/UL — LOW (ref 150–400)
POTASSIUM SERPL-MCNC: 3.5 MMOL/L — SIGNIFICANT CHANGE UP (ref 3.5–5.3)
POTASSIUM SERPL-MCNC: 4.2 MMOL/L — SIGNIFICANT CHANGE UP (ref 3.5–5.3)
POTASSIUM SERPL-SCNC: 3.5 MMOL/L — SIGNIFICANT CHANGE UP (ref 3.5–5.3)
POTASSIUM SERPL-SCNC: 4.2 MMOL/L — SIGNIFICANT CHANGE UP (ref 3.5–5.3)
PROT SERPL-MCNC: 6.8 G/DL — SIGNIFICANT CHANGE UP (ref 6–8.3)
PROT SERPL-MCNC: 6.9 G/DL — SIGNIFICANT CHANGE UP (ref 6–8.3)
PROT UR-MCNC: 30 MG/DL
RBC # BLD: 4.77 M/UL — SIGNIFICANT CHANGE UP (ref 3.8–5.2)
RBC # BLD: 4.93 M/UL — SIGNIFICANT CHANGE UP (ref 3.8–5.2)
RBC # FLD: 15.2 % — HIGH (ref 10.3–14.5)
RBC # FLD: 15.4 % — HIGH (ref 10.3–14.5)
RBC CASTS # UR COMP ASSIST: 15 /HPF — HIGH (ref 0–4)
RSV RNA NPH QL NAA+NON-PROBE: SIGNIFICANT CHANGE UP
S AUREUS DNA NOSE QL NAA+PROBE: SIGNIFICANT CHANGE UP
SARS-COV-2 RNA SPEC QL NAA+PROBE: SIGNIFICANT CHANGE UP
SODIUM SERPL-SCNC: 137 MMOL/L — SIGNIFICANT CHANGE UP (ref 135–145)
SODIUM SERPL-SCNC: 138 MMOL/L — SIGNIFICANT CHANGE UP (ref 135–145)
SP GR SPEC: 1.01 — SIGNIFICANT CHANGE UP (ref 1–1.03)
SPECIMEN SOURCE: SIGNIFICANT CHANGE UP
SPECIMEN SOURCE: SIGNIFICANT CHANGE UP
SQUAMOUS # UR AUTO: 5 /HPF — SIGNIFICANT CHANGE UP (ref 0–5)
TROPONIN T, HIGH SENSITIVITY RESULT: 30 NG/L — SIGNIFICANT CHANGE UP
TROPONIN T, HIGH SENSITIVITY RESULT: 46 NG/L — SIGNIFICANT CHANGE UP
UROBILINOGEN FLD QL: 1 MG/DL — SIGNIFICANT CHANGE UP (ref 0.2–1)
WBC # BLD: 10.53 K/UL — HIGH (ref 3.8–10.5)
WBC # BLD: 11.5 K/UL — HIGH (ref 3.8–10.5)
WBC # FLD AUTO: 10.53 K/UL — HIGH (ref 3.8–10.5)
WBC # FLD AUTO: 11.5 K/UL — HIGH (ref 3.8–10.5)
WBC UR QL: 104 /HPF — HIGH (ref 0–5)

## 2025-02-15 PROCEDURE — 71046 X-RAY EXAM CHEST 2 VIEWS: CPT | Mod: 26

## 2025-02-15 PROCEDURE — 74176 CT ABD & PELVIS W/O CONTRAST: CPT | Mod: 26

## 2025-02-15 PROCEDURE — 99223 1ST HOSP IP/OBS HIGH 75: CPT

## 2025-02-15 RX ORDER — ASPIRIN 325 MG
81 TABLET ORAL DAILY
Refills: 0 | Status: DISCONTINUED | OUTPATIENT
Start: 2025-02-15 | End: 2025-02-18

## 2025-02-15 RX ORDER — GRANISETRON HYDROCHLORIDE 1 MG/1
1 TABLET, FILM COATED ORAL
Refills: 0 | Status: DISCONTINUED | OUTPATIENT
Start: 2025-02-15 | End: 2025-02-15

## 2025-02-15 RX ORDER — MELATONIN 5 MG
3 TABLET ORAL AT BEDTIME
Refills: 0 | Status: DISCONTINUED | OUTPATIENT
Start: 2025-02-15 | End: 2025-02-18

## 2025-02-15 RX ORDER — ONDANSETRON HCL/PF 4 MG/2 ML
4 VIAL (ML) INJECTION EVERY 8 HOURS
Refills: 0 | Status: DISCONTINUED | OUTPATIENT
Start: 2025-02-15 | End: 2025-02-18

## 2025-02-15 RX ORDER — MAGNESIUM SULFATE 500 MG/ML
2 SYRINGE (ML) INJECTION ONCE
Refills: 0 | Status: COMPLETED | OUTPATIENT
Start: 2025-02-15 | End: 2025-02-15

## 2025-02-15 RX ORDER — SOD PHOS DI, MONO/K PHOS MONO 250 MG
2 TABLET ORAL ONCE
Refills: 0 | Status: COMPLETED | OUTPATIENT
Start: 2025-02-15 | End: 2025-02-15

## 2025-02-15 RX ORDER — ONDANSETRON HCL/PF 4 MG/2 ML
4 VIAL (ML) INJECTION ONCE
Refills: 0 | Status: COMPLETED | OUTPATIENT
Start: 2025-02-15 | End: 2025-02-15

## 2025-02-15 RX ORDER — CEFTRIAXONE 500 MG/1
1000 INJECTION, POWDER, FOR SOLUTION INTRAMUSCULAR; INTRAVENOUS ONCE
Refills: 0 | Status: COMPLETED | OUTPATIENT
Start: 2025-02-15 | End: 2025-02-15

## 2025-02-15 RX ORDER — CEFTRIAXONE 500 MG/1
1000 INJECTION, POWDER, FOR SOLUTION INTRAMUSCULAR; INTRAVENOUS EVERY 24 HOURS
Refills: 0 | Status: DISCONTINUED | OUTPATIENT
Start: 2025-02-16 | End: 2025-02-18

## 2025-02-15 RX ORDER — MAGNESIUM, ALUMINUM HYDROXIDE 200-200 MG
30 TABLET,CHEWABLE ORAL EVERY 4 HOURS
Refills: 0 | Status: DISCONTINUED | OUTPATIENT
Start: 2025-02-15 | End: 2025-02-18

## 2025-02-15 RX ORDER — METOPROLOL SUCCINATE 50 MG/1
0.5 TABLET, EXTENDED RELEASE ORAL
Refills: 0 | DISCHARGE

## 2025-02-15 RX ORDER — INFLUENZA A VIRUS A/IDAHO/07/2018 (H1N1) ANTIGEN (MDCK CELL DERIVED, PROPIOLACTONE INACTIVATED, INFLUENZA A VIRUS A/INDIANA/08/2018 (H3N2) ANTIGEN (MDCK CELL DERIVED, PROPIOLACTONE INACTIVATED), INFLUENZA B VIRUS B/SINGAPORE/INFTT-16-0610/2016 ANTIGEN (MDCK CELL DERIVED, PROPIOLACTONE INACTIVATED), INFLUENZA B VIRUS B/IOWA/06/2017 ANTIGEN (MDCK CELL DERIVED, PROPIOLACTONE INACTIVATED) 15; 15; 15; 15 UG/.5ML; UG/.5ML; UG/.5ML; UG/.5ML
0.5 INJECTION, SUSPENSION INTRAMUSCULAR ONCE
Refills: 0 | Status: DISCONTINUED | OUTPATIENT
Start: 2025-02-15 | End: 2025-02-18

## 2025-02-15 RX ORDER — ACETAMINOPHEN 500 MG/5ML
650 LIQUID (ML) ORAL EVERY 6 HOURS
Refills: 0 | Status: DISCONTINUED | OUTPATIENT
Start: 2025-02-15 | End: 2025-02-18

## 2025-02-15 RX ORDER — NIFEDIPINE 30 MG
1 TABLET, EXTENDED RELEASE 24 HR ORAL
Refills: 0 | DISCHARGE

## 2025-02-15 RX ORDER — ATORVASTATIN CALCIUM 80 MG/1
1 TABLET, FILM COATED ORAL
Refills: 0 | DISCHARGE

## 2025-02-15 RX ORDER — ATORVASTATIN CALCIUM 80 MG/1
20 TABLET, FILM COATED ORAL AT BEDTIME
Refills: 0 | Status: DISCONTINUED | OUTPATIENT
Start: 2025-02-15 | End: 2025-02-18

## 2025-02-15 RX ADMIN — CEFTRIAXONE 100 MILLIGRAM(S): 500 INJECTION, POWDER, FOR SOLUTION INTRAMUSCULAR; INTRAVENOUS at 02:01

## 2025-02-15 RX ADMIN — Medication 600 MILLIGRAM(S): at 00:19

## 2025-02-15 RX ADMIN — Medication 4 MILLIGRAM(S): at 01:07

## 2025-02-15 RX ADMIN — Medication 1000 MILLILITER(S): at 03:14

## 2025-02-15 RX ADMIN — Medication 1 APPLICATION(S): at 21:40

## 2025-02-15 RX ADMIN — Medication 81 MILLIGRAM(S): at 12:32

## 2025-02-15 RX ADMIN — Medication 650 MILLIGRAM(S): at 00:19

## 2025-02-15 RX ADMIN — Medication 650 MILLIGRAM(S): at 20:21

## 2025-02-15 RX ADMIN — Medication 1000 MILLILITER(S): at 01:06

## 2025-02-15 RX ADMIN — Medication 650 MILLIGRAM(S): at 19:21

## 2025-02-15 RX ADMIN — ATORVASTATIN CALCIUM 20 MILLIGRAM(S): 80 TABLET, FILM COATED ORAL at 21:55

## 2025-02-15 RX ADMIN — Medication 2 PACKET(S): at 07:42

## 2025-02-15 RX ADMIN — Medication 25 GRAM(S): at 07:41

## 2025-02-15 NOTE — PROGRESS NOTE ADULT - SUBJECTIVE AND OBJECTIVE BOX
Patient is a 89y old  Female who presents with a chief complaint of Sepsis 2/2 UTI (15 Feb 2025 08:20)      SUBJECTIVE / OVERNIGHT EVENTS:    MEDICATIONS  (STANDING):  aspirin enteric coated 81 milliGRAM(s) Oral daily  atorvastatin 20 milliGRAM(s) Oral at bedtime  cefTRIAXone   IVPB 1000 milliGRAM(s) IV Intermittent every 24 hours  influenza  Vaccine (HIGH DOSE) 0.5 milliLiter(s) IntraMuscular once    MEDICATIONS  (PRN):  acetaminophen     Tablet .. 650 milliGRAM(s) Oral every 6 hours PRN Temp greater or equal to 38C (100.4F), Mild Pain (1 - 3)  aluminum hydroxide/magnesium hydroxide/simethicone Suspension 30 milliLiter(s) Oral every 4 hours PRN Dyspepsia  melatonin 3 milliGRAM(s) Oral at bedtime PRN Insomnia  ondansetron Injectable 4 milliGRAM(s) IV Push every 8 hours PRN Nausea and/or Vomiting      CAPILLARY BLOOD GLUCOSE        I&O's Summary      Vital Signs Last 24 Hrs  T(C): 36.5 (15 Feb 2025 11:44), Max: 39.4 (14 Feb 2025 20:16)  T(F): 97.7 (15 Feb 2025 11:44), Max: 103 (14 Feb 2025 20:16)  HR: 88 (15 Feb 2025 11:44) (72 - 109)  BP: 132/81 (15 Feb 2025 11:44) (98/66 - 147/71)  BP(mean): --  RR: 17 (15 Feb 2025 11:44) (17 - 19)  SpO2: 96% (15 Feb 2025 11:44) (94% - 98%)    Parameters below as of 15 Feb 2025 11:44  Patient On (Oxygen Delivery Method): room air        PHYSICAL EXAM:  GENERAL: NAD, well-developed  HEAD: Atraumatic, Normocephalic  EYES: EOMI, PERRLA, conjunctiva and sclera clear  NECK: Supple, No JVD  CHEST/LUNG: Clear to auscultation bilaterally; No wheezes or crackles  HEART: Normal S1/S2; Regular rate and rhythm; No murmurs, rubs, or gallops  ABDOMEN: Soft, Nontender, Nondistended; Bowel sounds present  EXTREMITIES: 2+ Peripheral Pulses; No clubbing, cyanosis, or edema  PSYCH: A&Ox3  NEUROLOGY: no focal neurologic deficit  SKIN: No rashes or lesions    LABS:                        13.7   10.53 )-----------( 85       ( 15 Feb 2025 10:14 )             39.1      02-15    138  |  104  |  31[H]  ----------------------------<  100[H]  4.2   |  16[L]  |  1.91[H]    Ca    8.2[L]      15 Feb 2025 10:14  Phos  3.8     02-15  Mg     5.50     02-15    TPro  6.8  /  Alb  3.2[L]  /  TBili  0.6  /  DBili  x   /  AST  43[H]  /  ALT  16  /  AlkPhos  110  02-15          Urinalysis Basic - ( 15 Feb 2025 10:14 )    Color: x / Appearance: x / SG: x / pH: x  Gluc: 100 mg/dL / Ketone: x  / Bili: x / Urobili: x   Blood: x / Protein: x / Nitrite: x   Leuk Esterase: x / RBC: x / WBC x   Sq Epi: x / Non Sq Epi: x / Bacteria: x        RADIOLOGY & ADDITIONAL TESTS:    Imaging Personally Reviewed:    Consultant(s) Notes Reviewed:      Care Discussed with Consultants/Other Providers:

## 2025-02-15 NOTE — ED ADULT NURSE REASSESSMENT NOTE - NS ED NURSE REASSESS COMMENT FT1
PT is resting in stretcher, easily arousable to verbal stimuli. no apparent distress noted at this time.  hand off will be given to primary nurse when return to area.

## 2025-02-15 NOTE — ED ADULT NURSE REASSESSMENT NOTE - NS ED NURSE REASSESS COMMENT FT1
Received patient in room 5, admitted to medicine awaiting bed assignment. Patient resting in stretcher, no distress noted. Patient denies any pain or discomfort at this time. Patient is A&OX4, airway patent, breathing unlabored and even, radial pulses palpable. Side rails up and safety maintained. Fall precaution in place. Call bells within reach.

## 2025-02-15 NOTE — H&P ADULT - NSHPREVIEWOFSYSTEMS_GEN_ALL_CORE
REVIEW OF SYSTEMS:    CONSTITUTIONAL: +Generalized weakness, +fevers/chills  EYES: No visual changes or eye discharge  ENT: No rhinorrhea or sore throat  NECK: No pain or stiffness  RESPIRATORY: No cough, wheezing, hemoptysis; No shortness of breath  CARDIOVASCULAR: No chest pain or palpitations; No lower extremity edema  GASTROINTESTINAL: +Generalized abdominal pain. +nausea, No vomiting or hematemesis; No diarrhea or constipation. No melena or hematochezia.  BACK: No back pain  GENITOURINARY: +dysuria, +urgency/frequency, No hematuria  NEUROLOGICAL: No numbness or weakness  SKIN: No itching, burning, rashes, or lesions

## 2025-02-15 NOTE — PATIENT PROFILE ADULT - FUNCTIONAL ASSESSMENT - DAILY ACTIVITY 5.
[de-identified] : 76 year M with PMH HTN, HLD, and BPH presents for follow up. Pt denies CP/SOB, fever/chills, n/v/d/c.
4 = No assist / stand by assistance

## 2025-02-15 NOTE — H&P ADULT - PROBLEM SELECTOR PLAN 4
- No complaints of chest pain, palpitations, or dizziness/LOC currently, does report history of chronic intermittent palpitations  - EKG with PACs but no ischemic findings, troponin indeterminant at 46 but likely elevated 2/2 PEDRO PABLO on CKD    -> Given no chest pain or ischemic findings on EKG would hold off on cardiac monitoring  -> Repeat troponin in AM to assess trend

## 2025-02-15 NOTE — H&P ADULT - PROBLEM SELECTOR PLAN 7
DVT ppx - SCDs  Diet - DASH regular diet  Activity - OOB with assistance, increase as tolerated    Fall and aspiration precautions

## 2025-02-15 NOTE — H&P ADULT - ASSESSMENT
This is a 89F with history of HTN, HLD, CAD s/p 1 stent (2010), and CKD4 who presents to the hospital with urinary complaints with fevers/chills and gen abd pain/nausea found to have sepsis 2/2 UTI.

## 2025-02-15 NOTE — H&P ADULT - NSICDXPASTMEDICALHX_GEN_ALL_CORE_FT
PAST MEDICAL HISTORY:  Benign essential HTN     CAD (coronary artery disease)     Hypercholesteremia     Presence of stent in coronary artery     Stage 4 chronic kidney disease

## 2025-02-15 NOTE — PATIENT PROFILE ADULT - FALL HARM RISK - RISK INTERVENTIONS

## 2025-02-15 NOTE — H&P ADULT - HISTORY OF PRESENT ILLNESS
This is a 89F with history of HTN, HLD, CAD s/p 1 stent (2010), and CKD4 who presents to the hospital with complaints of fevers/chills, generalized abd pain, with associated dysuria and increased urinary frequency/urgency for the past few days. Also with associated nausea with dry heaves. No other acute complaints as per patient.     On arrival to the ED her vitals were T 103 oral -> 97.9, P 109, /71, RR 18, O2 sat 95% RA. Her lab work showed mild leukocytosis with bandemia, thrombocytopenia, elevated Cr, and a positive UA. Her lactate was elevated and resolved on repeat. Her imaging did not show acute findings. She was given NS 2L, ibuprofen 600mg PO x1, tylenol 650mg PO x1, zofran 4mg IV x1, and ceftriaxone 1g. She was admitted to medicine for further management.

## 2025-02-15 NOTE — H&P ADULT - PROBLEM SELECTOR PLAN 2
- Reports history of CKD4, unclear recent Cr level  - Prior Cr from 5/2021 showed her to have Cr 1.48 - 1.52  - Now elevated to 2.16    -> Likely has PEDRO PABLO on CKD4 in setting of her UTI and sepsis with increased insensible losses and likely decreased oral intake  -> Trend for now, monitor I/O  -> Clarify her baseline Cr in AM

## 2025-02-15 NOTE — H&P ADULT - PROBLEM SELECTOR PROBLEM 1
Addended by: KAYLAN HO on: 2/7/2025 01:44 PM     Modules accepted: Orders     Sepsis due to urinary tract infection

## 2025-02-15 NOTE — H&P ADULT - NSHPPHYSICALEXAM_GEN_ALL_CORE
Vital Signs Last 24 Hrs  T(C): 36.4 (15 Feb 2025 08:25), Max: 39.4 (14 Feb 2025 20:16)  T(F): 97.6 (15 Feb 2025 08:25), Max: 103 (14 Feb 2025 20:16)  HR: 77 (15 Feb 2025 08:25) (72 - 109)  BP: 113/73 (15 Feb 2025 08:25) (98/66 - 147/71)  BP(mean): --  RR: 17 (15 Feb 2025 08:25) (17 - 19)  SpO2: 96% (15 Feb 2025 08:25) (94% - 98%)    Parameters below as of 15 Feb 2025 08:25  Patient On (Oxygen Delivery Method): room air    GENERAL: No acute distress, well-developed  EYES/ENT: EOMI, PERRL, conjunctiva and sclera clear, Neck supple, No JVD, moist mucosa  CHEST/LUNG: Clear to auscultation bilaterally; No wheeze, equal breath sounds bilaterally   BACK: No spinal tenderness  HEART: Irregular rate, regular rhythm; No murmurs, rubs, or gallops  ABDOMEN: Soft, +Mild TTP throughout abdomen, Nondistended; Bowel sounds present  EXTREMITIES: +DP/PT/Radial pulses b/l, No LE edema or asymmetry noted  PSYCH: Nl behavior, nl affect  NEUROLOGY: AAOx3, non-focal  SKIN: Normal color, No rashes or lesions

## 2025-02-15 NOTE — H&P ADULT - PROBLEM SELECTOR PLAN 3
- Thrombocytopenia to 82k, previously was 110s-130s in May 2021  - No bleeding complaints as per patient    -> Likely has worsened thrombocytopenia 2/2 sepsis, would trend for now  -> c/w home low dose aspirin, monitor for bleeding

## 2025-02-15 NOTE — H&P ADULT - PROBLEM SELECTOR PLAN 5
- On losartan 100mg daily, nifedipine 60mg daily, metoprolol 12.5mg BID    -> Hold losartan given likely PEDRO PABLO on CKD4  -> Hold nifedipine and metop tart for now as her BPs are currently well controlled, restart as needed

## 2025-02-15 NOTE — H&P ADULT - PROBLEM SELECTOR PLAN 1
- Patient with sepsis given her temp to 103 and tachycardia, source is urine  - Also with bands of ~8%  - s/p ceftriaxone in ED  - Currently afebrile    -> c/w ceftriaxone (likely total abx duration of 3-7 days, 2/14 -   -> BCx and UCx in lab  -> Lactate normalized  -> Trend WBC, fever curve, vitals  -> Of note, CT A/P non-con with bilateral renal cysts, largest measuring 8cm, doubtful this is playing a role in her infection (especially as she has no CVA TTP) but can consider additional w/u if she does not improve  -> Has generalized abdominal pain currently on exam but improving as per patient -> monitor

## 2025-02-15 NOTE — H&P ADULT - NSHPLABSRESULTS_GEN_ALL_CORE
LABS and ADDITIONAL STUDIES:                        13.4   11.50 )-----------( 82       ( 15 Feb 2025 00:52 )             38.0   Manual Neutrophil Bands %: 7.9 % (02.15.25 @ 00:52)     137  |  103  |  34[H]  ----------------------------<  107[H]     02-15  3.5   |  17[L]  |  2.16[H]    Ca    8.7      15 Feb 2025 00:52  Phos  1.9     02-15  Mg     1.40     02-15    TPro  6.9  /  Alb  3.3  /  TBili  0.8  /  DBili  x   /  AST  32  /  ALT  13  /  AlkPhos  107  02-15    00:46 - VBG - pH: 7.39  | pCO2: 36    | pO2: 46    | Lactate: 2.4    Lactate, Blood: 1.6 mmol/L (02.15.25 @ 04:07)     hs Troponin, T - 46 ng/L (02-15-25 @ 00:46)    Urinalysis Basic - ( 15 Feb 2025 00:46 )  Color: Yellow / Appearance: Cloudy / S.011 / pH: 6.0  Gluc: Negative mg/dL / Ketone: Negative mg/dL  / Bili: Negative / Urobili: 1.0 mg/dL   Blood: Large / Protein: 30 mg/dL / Nitrite: Positive   Leuk Esterase: Large / RBC: 15 /HPF /  /HPF   Sq Epi: 5 /HPF / Bacteria: Many /HPF  Hyaline Casts: x/WBC Casts: x    Flu/COVID/RSV - neg    < from: Xray Chest 2 Views PA/Lat (02.15.25 @ 03:06) >  ******PRELIMINARY REPORT******    FINDINGS:  The heart is normal in size.  The lungs are clear.  There is no pneumothorax or pleural effusion.  No acute osseous abnormalities.    IMPRESSION:  No consolidation.  < end of copied text >    < from: CT Abdomen and Pelvis No Cont (02.15.25 @ 01:42) >  FINDINGS:  LOWER CHEST: Trace bilateral pleural effusions and mild bibasilar atelectasis (right greater than left). Thoracic aortic and coronary artery atherosclerotic calcification.    LIVER: Within normal limits.  BILE DUCTS: Normal caliber.  GALLBLADDER: Within normal limits.  SPLEEN: Within normal limits.  PANCREAS: Within normal limits.  ADRENALS: Within normal limits.  KIDNEYS/URETERS: No right or left hydroureteronephrosis or obstructing urinary tract calculus. Bilateral renal cysts and subcentimeter low-attenuation lesions that are too small to characterize, largest cyst arising from the lower pole of the right kidney measuring up to 8 cm. Subcentimeter peripherally calcified cyst at the upper pole of the left kidney.    BLADDER: Within normal limits.  REPRODUCTIVE ORGANS: Fibroid uterus. Adnexa are unremarkable.    BOWEL: No bowel obstruction. Appendix is normal. Colonic diverticulosis without evidence of diverticulitis. Small bowel anastomosis in the mid abdomen.  PERITONEUM/RETROPERITONEUM: Trace pelvic free fluid. No pneumoperitoneum.  VESSELS: Atherosclerotic calcifications of the aortoiliac tree. Normal caliber abdominal aorta.  LYMPH NODES: No lymphadenopathy.  ABDOMINAL WALL: Postsurgical changes.  BONES: Degenerative changes of the spine. Prominent Schmorl's nodes along the superior endplates of L3 and L4.    IMPRESSION:  No acute findings on unenhanced CT the abdomen and pelvis explain patient's abdominal pain.    Colonic diverticulosis without evidence of diverticulitis.    Bilateral renal cysts. No right or left hydroureteronephrosis or obstructing urinary tract calculus.    Fibroid uterus.  --- End of Report ---  < end of copied text >    EKG - Sinus rhythm with PACs, rate 82, QTc 441, no significant ST-T wave changes

## 2025-02-16 LAB
ALBUMIN SERPL ELPH-MCNC: 3.4 G/DL — SIGNIFICANT CHANGE UP (ref 3.3–5)
ALP SERPL-CCNC: 99 U/L — SIGNIFICANT CHANGE UP (ref 40–120)
ALT FLD-CCNC: 17 U/L — SIGNIFICANT CHANGE UP (ref 4–33)
ANION GAP SERPL CALC-SCNC: 15 MMOL/L — HIGH (ref 7–14)
AST SERPL-CCNC: 44 U/L — HIGH (ref 4–32)
BASOPHILS # BLD AUTO: 0.03 K/UL — SIGNIFICANT CHANGE UP (ref 0–0.2)
BASOPHILS NFR BLD AUTO: 0.3 % — SIGNIFICANT CHANGE UP (ref 0–2)
BILIRUB SERPL-MCNC: 0.5 MG/DL — SIGNIFICANT CHANGE UP (ref 0.2–1.2)
BUN SERPL-MCNC: 29 MG/DL — HIGH (ref 7–23)
CALCIUM SERPL-MCNC: 8.9 MG/DL — SIGNIFICANT CHANGE UP (ref 8.4–10.5)
CHLORIDE SERPL-SCNC: 104 MMOL/L — SIGNIFICANT CHANGE UP (ref 98–107)
CO2 SERPL-SCNC: 22 MMOL/L — SIGNIFICANT CHANGE UP (ref 22–31)
CREAT SERPL-MCNC: 1.85 MG/DL — HIGH (ref 0.5–1.3)
EGFR: 25 ML/MIN/1.73M2 — LOW
EOSINOPHIL # BLD AUTO: 0.13 K/UL — SIGNIFICANT CHANGE UP (ref 0–0.5)
EOSINOPHIL NFR BLD AUTO: 1.5 % — SIGNIFICANT CHANGE UP (ref 0–6)
GLUCOSE SERPL-MCNC: 80 MG/DL — SIGNIFICANT CHANGE UP (ref 70–99)
HCT VFR BLD CALC: 37.3 % — SIGNIFICANT CHANGE UP (ref 34.5–45)
HGB BLD-MCNC: 13 G/DL — SIGNIFICANT CHANGE UP (ref 11.5–15.5)
IANC: 6.57 K/UL — SIGNIFICANT CHANGE UP (ref 1.8–7.4)
IMM GRANULOCYTES NFR BLD AUTO: 0.7 % — SIGNIFICANT CHANGE UP (ref 0–0.9)
LYMPHOCYTES # BLD AUTO: 0.73 K/UL — LOW (ref 1–3.3)
LYMPHOCYTES # BLD AUTO: 8.4 % — LOW (ref 13–44)
MAGNESIUM SERPL-MCNC: 2.1 MG/DL — SIGNIFICANT CHANGE UP (ref 1.6–2.6)
MCHC RBC-ENTMCNC: 28.1 PG — SIGNIFICANT CHANGE UP (ref 27–34)
MCHC RBC-ENTMCNC: 34.9 G/DL — SIGNIFICANT CHANGE UP (ref 32–36)
MCV RBC AUTO: 80.6 FL — SIGNIFICANT CHANGE UP (ref 80–100)
MONOCYTES # BLD AUTO: 1.2 K/UL — HIGH (ref 0–0.9)
MONOCYTES NFR BLD AUTO: 13.8 % — SIGNIFICANT CHANGE UP (ref 2–14)
NEUTROPHILS # BLD AUTO: 6.57 K/UL — SIGNIFICANT CHANGE UP (ref 1.8–7.4)
NEUTROPHILS NFR BLD AUTO: 75.3 % — SIGNIFICANT CHANGE UP (ref 43–77)
NRBC # BLD AUTO: 0 K/UL — SIGNIFICANT CHANGE UP (ref 0–0)
NRBC # FLD: 0 K/UL — SIGNIFICANT CHANGE UP (ref 0–0)
NRBC BLD AUTO-RTO: 0 /100 WBCS — SIGNIFICANT CHANGE UP (ref 0–0)
PHOSPHATE SERPL-MCNC: 3.1 MG/DL — SIGNIFICANT CHANGE UP (ref 2.5–4.5)
PLATELET # BLD AUTO: 94 K/UL — LOW (ref 150–400)
POTASSIUM SERPL-MCNC: 4.1 MMOL/L — SIGNIFICANT CHANGE UP (ref 3.5–5.3)
POTASSIUM SERPL-SCNC: 4.1 MMOL/L — SIGNIFICANT CHANGE UP (ref 3.5–5.3)
PROT SERPL-MCNC: 6.8 G/DL — SIGNIFICANT CHANGE UP (ref 6–8.3)
RBC # BLD: 4.63 M/UL — SIGNIFICANT CHANGE UP (ref 3.8–5.2)
RBC # FLD: 15.8 % — HIGH (ref 10.3–14.5)
SODIUM SERPL-SCNC: 141 MMOL/L — SIGNIFICANT CHANGE UP (ref 135–145)
WBC # BLD: 8.72 K/UL — SIGNIFICANT CHANGE UP (ref 3.8–10.5)
WBC # FLD AUTO: 8.72 K/UL — SIGNIFICANT CHANGE UP (ref 3.8–10.5)

## 2025-02-16 PROCEDURE — 99232 SBSQ HOSP IP/OBS MODERATE 35: CPT | Mod: GC

## 2025-02-16 RX ORDER — METOPROLOL SUCCINATE 50 MG/1
12.5 TABLET, EXTENDED RELEASE ORAL
Refills: 0 | Status: DISCONTINUED | OUTPATIENT
Start: 2025-02-16 | End: 2025-02-18

## 2025-02-16 RX ORDER — NIFEDIPINE 30 MG
60 TABLET, EXTENDED RELEASE 24 HR ORAL DAILY
Refills: 0 | Status: DISCONTINUED | OUTPATIENT
Start: 2025-02-16 | End: 2025-02-18

## 2025-02-16 RX ORDER — NIFEDIPINE 30 MG
60 TABLET, EXTENDED RELEASE 24 HR ORAL DAILY
Refills: 0 | Status: DISCONTINUED | OUTPATIENT
Start: 2025-02-16 | End: 2025-02-16

## 2025-02-16 RX ORDER — POLYETHYLENE GLYCOL 3350 17 G/17G
17 POWDER, FOR SOLUTION ORAL DAILY
Refills: 0 | Status: DISCONTINUED | OUTPATIENT
Start: 2025-02-16 | End: 2025-02-18

## 2025-02-16 RX ORDER — METOPROLOL SUCCINATE 50 MG/1
12.5 TABLET, EXTENDED RELEASE ORAL
Refills: 0 | Status: DISCONTINUED | OUTPATIENT
Start: 2025-02-16 | End: 2025-02-16

## 2025-02-16 RX ORDER — SENNA 187 MG
2 TABLET ORAL AT BEDTIME
Refills: 0 | Status: DISCONTINUED | OUTPATIENT
Start: 2025-02-16 | End: 2025-02-18

## 2025-02-16 RX ADMIN — METOPROLOL SUCCINATE 12.5 MILLIGRAM(S): 50 TABLET, EXTENDED RELEASE ORAL at 18:07

## 2025-02-16 RX ADMIN — CEFTRIAXONE 100 MILLIGRAM(S): 500 INJECTION, POWDER, FOR SOLUTION INTRAMUSCULAR; INTRAVENOUS at 01:35

## 2025-02-16 RX ADMIN — Medication 2 TABLET(S): at 22:00

## 2025-02-16 RX ADMIN — ATORVASTATIN CALCIUM 20 MILLIGRAM(S): 80 TABLET, FILM COATED ORAL at 21:32

## 2025-02-16 RX ADMIN — Medication 81 MILLIGRAM(S): at 11:27

## 2025-02-16 RX ADMIN — POLYETHYLENE GLYCOL 3350 17 GRAM(S): 17 POWDER, FOR SOLUTION ORAL at 21:38

## 2025-02-16 RX ADMIN — Medication 1 APPLICATION(S): at 05:11

## 2025-02-16 NOTE — PROGRESS NOTE ADULT - PROBLEM SELECTOR PLAN 2
- Reports history of CKD4, unclear recent Cr level  - Prior Cr from 5/2021 showed her to have Cr 1.48 - 1.52  - Now elevated to 2.16    -> Likely has PEDRO PABLO on CKD4 in setting of her UTI and sepsis with increased insensible losses and likely decreased oral intake  -> Trend for now, monitor I/O  -> Clarify her baseline Cr in AM Reports history of CKD4, unclear recent Cr level. Prior Cr from 5/2021 showed her to have Cr 1.48 - 1.52. Was 2.16 in ED, now down to 1.8. Likely PEDRO PABLO i/s/o UTI. Patient has been tolerating increased PO intake.    Plan  - Encourage PO intake  - Trend SCr  - Clarify baseline Creatinine

## 2025-02-16 NOTE — PROGRESS NOTE ADULT - PROBLEM SELECTOR PLAN 5
- On losartan 100mg daily, nifedipine 60mg daily, metoprolol 12.5mg BID    -> Hold losartan given likely PEDRO PABLO on CKD4  -> Hold nifedipine and metop tart for now as her BPs are currently well controlled, restart as needed - On losartan 100mg daily, nifedipine 60mg daily, metoprolol 12.5mg BID.     Plan  - Hold losartan given likely PEDRO PABLO on CKD4  - Restart nifedipine and metop tart

## 2025-02-16 NOTE — PROGRESS NOTE ADULT - PROBLEM SELECTOR PLAN 1
- Patient with sepsis given her temp to 103 and tachycardia, source is urine  - Also with bands of ~8%  - s/p ceftriaxone in ED  - Currently afebrile    -> c/w ceftriaxone (likely total abx duration of 3-7 days, 2/14 -   -> BCx and UCx in lab  -> Lactate normalized  -> Trend WBC, fever curve, vitals  -> Of note, CT A/P non-con with bilateral renal cysts, largest measuring 8cm, doubtful this is playing a role in her infection (especially as she has no CVA TTP) but can consider additional w/u if she does not improve  -> Has generalized abdominal pain currently on exam but improving as per patient -> monitor Patient has UTI w/+ E Coli in Urine Cultures. Patient's symptoms are improving on Ceftriaxone 1g qd. However, Bcx positive for GNR and E Coli.     Plan  - Change Ceftriaxone dose to 2g qd i/s/o of Gram Negative Bactermia. Course likely 10-14 days  - Repeat Blood Cultures  - Trend WBC, fever curve, vitals  - Of note, CT A/P non-con with bilateral renal cysts, largest measuring 8cm, doubtful this is playing a role in her infection (especially as she has no CVA TTP) but can consider additional w/u if she does not improve

## 2025-02-16 NOTE — PROGRESS NOTE ADULT - PROBLEM SELECTOR PLAN 4
- No complaints of chest pain, palpitations, or dizziness/LOC currently, does report history of chronic intermittent palpitations  - EKG with PACs but no ischemic findings, troponin indeterminant at 46 but likely elevated 2/2 PEDRO PABLO on CKD    -> Given no chest pain or ischemic findings on EKG would hold off on cardiac monitoring  -> Repeat troponin in AM to assess trend - No complaints of chest pain, palpitations, or dizziness/LOC currently, does report history of chronic intermittent palpitations  - EKG with PACs but no ischemic findings, troponin indeterminant at 46 but likely elevated 2/2 PEDRO PABLO on CKD    -> Given no chest pain or ischemic findings on EKG would hold off on cardiac monitoring

## 2025-02-16 NOTE — PROGRESS NOTE ADULT - PROBLEM SELECTOR PLAN 3
- Thrombocytopenia to 82k, previously was 110s-130s in May 2021  - No bleeding complaints as per patient    -> Likely has worsened thrombocytopenia 2/2 sepsis, would trend for now  -> c/w home low dose aspirin, monitor for bleeding Thrombocytopenia to 82k, previously was 110s-130s in May 2021. -Likely has worsened thrombocytopenia 2/2 UTI + bacteremia. Improved today 2/16.    Plan  - Trend CBC  - Monitor for bleeding

## 2025-02-16 NOTE — PROGRESS NOTE ADULT - ASSESSMENT
This is a 89F with history of HTN, HLD, CAD s/p 1 stent (2010), and CKD4 who presents to the hospital with urinary complaints with fevers/chills and gen abd pain/nausea found to have sepsis 2/2 UTI.  This is a 89F with history of HTN, HLD, CAD s/p 1 stent (2010), and CKD4 who presents to the hospital with urinary complaints with fevers/chills and gen abd pain/nausea found to have sepsis 2/2 UTI. Blood cultures + for Gram Negative Rods and E. Coli. U Cx + for E coli. Ceftriaxone changed to 2g qd.

## 2025-02-16 NOTE — PROGRESS NOTE ADULT - SUBJECTIVE AND OBJECTIVE BOX
Patient is a 89y old  Female who presents with a chief complaint of Sepsis 2/2 UTI (15 Feb 2025 12:54)      SUBJECTIVE / OVERNIGHT EVENTS:    MEDICATIONS  (STANDING):  aspirin enteric coated 81 milliGRAM(s) Oral daily  atorvastatin 20 milliGRAM(s) Oral at bedtime  cefTRIAXone   IVPB 1000 milliGRAM(s) IV Intermittent every 24 hours  chlorhexidine 2% Cloths 1 Application(s) Topical <User Schedule>  influenza  Vaccine (HIGH DOSE) 0.5 milliLiter(s) IntraMuscular once    MEDICATIONS  (PRN):  acetaminophen     Tablet .. 650 milliGRAM(s) Oral every 6 hours PRN Temp greater or equal to 38C (100.4F), Mild Pain (1 - 3)  aluminum hydroxide/magnesium hydroxide/simethicone Suspension 30 milliLiter(s) Oral every 4 hours PRN Dyspepsia  melatonin 3 milliGRAM(s) Oral at bedtime PRN Insomnia  ondansetron Injectable 4 milliGRAM(s) IV Push every 8 hours PRN Nausea and/or Vomiting      CAPILLARY BLOOD GLUCOSE        I&O's Summary    15 Feb 2025 07:01  -  16 Feb 2025 07:00  --------------------------------------------------------  IN: 400 mL / OUT: 1100 mL / NET: -700 mL        Vital Signs Last 24 Hrs  T(C): 36.7 (16 Feb 2025 06:28), Max: 37.1 (15 Feb 2025 21:47)  T(F): 98.1 (16 Feb 2025 06:28), Max: 98.7 (15 Feb 2025 21:47)  HR: 89 (16 Feb 2025 06:28) (77 - 89)  BP: 143/65 (16 Feb 2025 06:28) (106/60 - 143/65)  BP(mean): --  RR: 18 (15 Feb 2025 21:47) (17 - 18)  SpO2: 100% (16 Feb 2025 06:28) (96% - 100%)    Parameters below as of 16 Feb 2025 06:28  Patient On (Oxygen Delivery Method): room air        PHYSICAL EXAM:  GENERAL: NAD, well-developed  HEAD: Atraumatic, Normocephalic  EYES: EOMI, PERRLA, conjunctiva and sclera clear  NECK: Supple, No JVD  CHEST/LUNG: Clear to auscultation bilaterally; No wheezes or crackles  HEART: Normal S1/S2; Regular rate and rhythm; No murmurs, rubs, or gallops  ABDOMEN: Soft, Nontender, Nondistended; Bowel sounds present  EXTREMITIES: 2+ Peripheral Pulses; No clubbing, cyanosis, or edema  PSYCH: A&Ox3  NEUROLOGY: no focal neurologic deficit  SKIN: No rashes or lesions    LABS:                        13.7   10.53 )-----------( 85       ( 15 Feb 2025 10:14 )             39.1      02-15    138  |  104  |  31[H]  ----------------------------<  100[H]  4.2   |  16[L]  |  1.91[H]    Ca    8.2[L]      15 Feb 2025 10:14  Phos  3.8     02-15  Mg     5.50     02-15    TPro  6.8  /  Alb  3.2[L]  /  TBili  0.6  /  DBili  x   /  AST  43[H]  /  ALT  16  /  AlkPhos  110  02-15          Urinalysis Basic - ( 15 Feb 2025 10:14 )    Color: x / Appearance: x / SG: x / pH: x  Gluc: 100 mg/dL / Ketone: x  / Bili: x / Urobili: x   Blood: x / Protein: x / Nitrite: x   Leuk Esterase: x / RBC: x / WBC x   Sq Epi: x / Non Sq Epi: x / Bacteria: x        RADIOLOGY & ADDITIONAL TESTS:    Imaging Personally Reviewed:    Consultant(s) Notes Reviewed:      Care Discussed with Consultants/Other Providers:   Patient is a 89y old  Female who presents with a chief complaint of Sepsis 2/2 UTI (15 Feb 2025 12:54)      SUBJECTIVE / OVERNIGHT EVENTS:  No overnight events     Patient is feeling much better.     MEDICATIONS  (STANDING):  aspirin enteric coated 81 milliGRAM(s) Oral daily  atorvastatin 20 milliGRAM(s) Oral at bedtime  cefTRIAXone   IVPB 1000 milliGRAM(s) IV Intermittent every 24 hours  chlorhexidine 2% Cloths 1 Application(s) Topical <User Schedule>  influenza  Vaccine (HIGH DOSE) 0.5 milliLiter(s) IntraMuscular once    MEDICATIONS  (PRN):  acetaminophen     Tablet .. 650 milliGRAM(s) Oral every 6 hours PRN Temp greater or equal to 38C (100.4F), Mild Pain (1 - 3)  aluminum hydroxide/magnesium hydroxide/simethicone Suspension 30 milliLiter(s) Oral every 4 hours PRN Dyspepsia  melatonin 3 milliGRAM(s) Oral at bedtime PRN Insomnia  ondansetron Injectable 4 milliGRAM(s) IV Push every 8 hours PRN Nausea and/or Vomiting      CAPILLARY BLOOD GLUCOSE        I&O's Summary    15 Feb 2025 07:01  -  16 Feb 2025 07:00  --------------------------------------------------------  IN: 400 mL / OUT: 1100 mL / NET: -700 mL        Vital Signs Last 24 Hrs  T(C): 36.7 (16 Feb 2025 06:28), Max: 37.1 (15 Feb 2025 21:47)  T(F): 98.1 (16 Feb 2025 06:28), Max: 98.7 (15 Feb 2025 21:47)  HR: 89 (16 Feb 2025 06:28) (77 - 89)  BP: 143/65 (16 Feb 2025 06:28) (106/60 - 143/65)  BP(mean): --  RR: 18 (15 Feb 2025 21:47) (17 - 18)  SpO2: 100% (16 Feb 2025 06:28) (96% - 100%)    Parameters below as of 16 Feb 2025 06:28  Patient On (Oxygen Delivery Method): room air        PHYSICAL EXAM:  GENERAL: NAD, well-developed  HEAD: Atraumatic, Normocephalic  EYES: EOMI, PERRLA, conjunctiva and sclera clear  NECK: Supple, No JVD  CHEST/LUNG: Clear to auscultation bilaterally; No wheezes or crackles  HEART: Normal S1/S2; Regular rate and rhythm; No murmurs, rubs, or gallops  ABDOMEN: TTP in Suprapubic region   EXTREMITIES: 2+ Peripheral Pulses; No clubbing, cyanosis, or edema  PSYCH: A&Ox3  NEUROLOGY: no focal neurologic deficit  SKIN: No rashes or lesions    LABS:                        13.7   10.53 )-----------( 85       ( 15 Feb 2025 10:14 )             39.1      02-15    138  |  104  |  31[H]  ----------------------------<  100[H]  4.2   |  16[L]  |  1.91[H]    Ca    8.2[L]      15 Feb 2025 10:14  Phos  3.8     02-15  Mg     5.50     02-15    TPro  6.8  /  Alb  3.2[L]  /  TBili  0.6  /  DBili  x   /  AST  43[H]  /  ALT  16  /  AlkPhos  110  02-15          Urinalysis Basic - ( 15 Feb 2025 10:14 )    Color: x / Appearance: x / SG: x / pH: x  Gluc: 100 mg/dL / Ketone: x  / Bili: x / Urobili: x   Blood: x / Protein: x / Nitrite: x   Leuk Esterase: x / RBC: x / WBC x   Sq Epi: x / Non Sq Epi: x / Bacteria: x        RADIOLOGY & ADDITIONAL TESTS:    Imaging Personally Reviewed:    Consultant(s) Notes Reviewed:      Care Discussed with Consultants/Other Providers:

## 2025-02-17 ENCOUNTER — TRANSCRIPTION ENCOUNTER (OUTPATIENT)
Age: 89
End: 2025-02-17

## 2025-02-17 LAB
-  AMOXICILLIN/CLAVULANIC ACID: SIGNIFICANT CHANGE UP
-  AMPICILLIN/SULBACTAM: SIGNIFICANT CHANGE UP
-  AMPICILLIN/SULBACTAM: SIGNIFICANT CHANGE UP
-  AMPICILLIN: SIGNIFICANT CHANGE UP
-  AMPICILLIN: SIGNIFICANT CHANGE UP
-  AZTREONAM: SIGNIFICANT CHANGE UP
-  AZTREONAM: SIGNIFICANT CHANGE UP
-  CEFAZOLIN: SIGNIFICANT CHANGE UP
-  CEFAZOLIN: SIGNIFICANT CHANGE UP
-  CEFEPIME: SIGNIFICANT CHANGE UP
-  CEFEPIME: SIGNIFICANT CHANGE UP
-  CEFOXITIN: SIGNIFICANT CHANGE UP
-  CEFOXITIN: SIGNIFICANT CHANGE UP
-  CEFTRIAXONE: SIGNIFICANT CHANGE UP
-  CEFTRIAXONE: SIGNIFICANT CHANGE UP
-  CEFUROXIME: SIGNIFICANT CHANGE UP
-  CIPROFLOXACIN: SIGNIFICANT CHANGE UP
-  CIPROFLOXACIN: SIGNIFICANT CHANGE UP
-  ERTAPENEM: SIGNIFICANT CHANGE UP
-  ERTAPENEM: SIGNIFICANT CHANGE UP
-  GENTAMICIN: SIGNIFICANT CHANGE UP
-  GENTAMICIN: SIGNIFICANT CHANGE UP
-  IMIPENEM: SIGNIFICANT CHANGE UP
-  IMIPENEM: SIGNIFICANT CHANGE UP
-  LEVOFLOXACIN: SIGNIFICANT CHANGE UP
-  LEVOFLOXACIN: SIGNIFICANT CHANGE UP
-  MEROPENEM: SIGNIFICANT CHANGE UP
-  MEROPENEM: SIGNIFICANT CHANGE UP
-  NITROFURANTOIN: SIGNIFICANT CHANGE UP
-  PIPERACILLIN/TAZOBACTAM: SIGNIFICANT CHANGE UP
-  PIPERACILLIN/TAZOBACTAM: SIGNIFICANT CHANGE UP
-  TOBRAMYCIN: SIGNIFICANT CHANGE UP
-  TOBRAMYCIN: SIGNIFICANT CHANGE UP
-  TRIMETHOPRIM/SULFAMETHOXAZOLE: SIGNIFICANT CHANGE UP
-  TRIMETHOPRIM/SULFAMETHOXAZOLE: SIGNIFICANT CHANGE UP
ALBUMIN SERPL ELPH-MCNC: 3 G/DL — LOW (ref 3.3–5)
ALP SERPL-CCNC: 84 U/L — SIGNIFICANT CHANGE UP (ref 40–120)
ALT FLD-CCNC: 15 U/L — SIGNIFICANT CHANGE UP (ref 4–33)
ANION GAP SERPL CALC-SCNC: 13 MMOL/L — SIGNIFICANT CHANGE UP (ref 7–14)
AST SERPL-CCNC: 31 U/L — SIGNIFICANT CHANGE UP (ref 4–32)
BILIRUB SERPL-MCNC: 0.4 MG/DL — SIGNIFICANT CHANGE UP (ref 0.2–1.2)
BUN SERPL-MCNC: 25 MG/DL — HIGH (ref 7–23)
CALCIUM SERPL-MCNC: 8.6 MG/DL — SIGNIFICANT CHANGE UP (ref 8.4–10.5)
CHLORIDE SERPL-SCNC: 106 MMOL/L — SIGNIFICANT CHANGE UP (ref 98–107)
CO2 SERPL-SCNC: 21 MMOL/L — LOW (ref 22–31)
CREAT SERPL-MCNC: 1.57 MG/DL — HIGH (ref 0.5–1.3)
CULTURE RESULTS: ABNORMAL
EGFR: 31 ML/MIN/1.73M2 — LOW
GLUCOSE SERPL-MCNC: 92 MG/DL — SIGNIFICANT CHANGE UP (ref 70–99)
HCT VFR BLD CALC: 32.7 % — LOW (ref 34.5–45)
HGB BLD-MCNC: 11.5 G/DL — SIGNIFICANT CHANGE UP (ref 11.5–15.5)
MAGNESIUM SERPL-MCNC: 2 MG/DL — SIGNIFICANT CHANGE UP (ref 1.6–2.6)
MCHC RBC-ENTMCNC: 28.2 PG — SIGNIFICANT CHANGE UP (ref 27–34)
MCHC RBC-ENTMCNC: 35.2 G/DL — SIGNIFICANT CHANGE UP (ref 32–36)
MCV RBC AUTO: 80.1 FL — SIGNIFICANT CHANGE UP (ref 80–100)
METHOD TYPE: SIGNIFICANT CHANGE UP
METHOD TYPE: SIGNIFICANT CHANGE UP
NRBC # BLD AUTO: 0 K/UL — SIGNIFICANT CHANGE UP (ref 0–0)
NRBC # FLD: 0 K/UL — SIGNIFICANT CHANGE UP (ref 0–0)
NRBC BLD AUTO-RTO: 0 /100 WBCS — SIGNIFICANT CHANGE UP (ref 0–0)
ORGANISM # SPEC MICROSCOPIC CNT: ABNORMAL
PHOSPHATE SERPL-MCNC: 3.1 MG/DL — SIGNIFICANT CHANGE UP (ref 2.5–4.5)
PLATELET # BLD AUTO: 107 K/UL — LOW (ref 150–400)
POTASSIUM SERPL-MCNC: 3.9 MMOL/L — SIGNIFICANT CHANGE UP (ref 3.5–5.3)
POTASSIUM SERPL-SCNC: 3.9 MMOL/L — SIGNIFICANT CHANGE UP (ref 3.5–5.3)
PROT SERPL-MCNC: 6.2 G/DL — SIGNIFICANT CHANGE UP (ref 6–8.3)
RBC # BLD: 4.08 M/UL — SIGNIFICANT CHANGE UP (ref 3.8–5.2)
RBC # FLD: 15.5 % — HIGH (ref 10.3–14.5)
SODIUM SERPL-SCNC: 140 MMOL/L — SIGNIFICANT CHANGE UP (ref 135–145)
SPECIMEN SOURCE: SIGNIFICANT CHANGE UP
WBC # BLD: 5.78 K/UL — SIGNIFICANT CHANGE UP (ref 3.8–10.5)
WBC # FLD AUTO: 5.78 K/UL — SIGNIFICANT CHANGE UP (ref 3.8–10.5)

## 2025-02-17 PROCEDURE — 99232 SBSQ HOSP IP/OBS MODERATE 35: CPT | Mod: GC

## 2025-02-17 RX ADMIN — METOPROLOL SUCCINATE 12.5 MILLIGRAM(S): 50 TABLET, EXTENDED RELEASE ORAL at 05:40

## 2025-02-17 RX ADMIN — POLYETHYLENE GLYCOL 3350 17 GRAM(S): 17 POWDER, FOR SOLUTION ORAL at 11:39

## 2025-02-17 RX ADMIN — CEFTRIAXONE 100 MILLIGRAM(S): 500 INJECTION, POWDER, FOR SOLUTION INTRAMUSCULAR; INTRAVENOUS at 01:06

## 2025-02-17 RX ADMIN — ATORVASTATIN CALCIUM 20 MILLIGRAM(S): 80 TABLET, FILM COATED ORAL at 22:06

## 2025-02-17 RX ADMIN — METOPROLOL SUCCINATE 12.5 MILLIGRAM(S): 50 TABLET, EXTENDED RELEASE ORAL at 17:06

## 2025-02-17 RX ADMIN — Medication 1 APPLICATION(S): at 05:56

## 2025-02-17 RX ADMIN — Medication 60 MILLIGRAM(S): at 05:43

## 2025-02-17 RX ADMIN — Medication 2 TABLET(S): at 22:06

## 2025-02-17 RX ADMIN — Medication 81 MILLIGRAM(S): at 11:39

## 2025-02-17 NOTE — PROGRESS NOTE ADULT - PROBLEM SELECTOR PLAN 2
Reports history of CKD4, unclear recent Cr level. Prior Cr from 5/2021 showed her to have Cr 1.48 - 1.52. Was 2.16 in ED, now down to 1.8. Likely PEDRO PABLO i/s/o UTI. Patient has been tolerating increased PO intake.    Plan  - Encourage PO intake  - Trend SCr  - Clarify baseline Creatinine Reports history of CKD4, unclear recent Cr level. Prior Cr btwn 1.48-1.52 as of 5/2021.  Mild PEDRO PABLO on admission (Crt 2.16), now downtrending s/p IVF. Likely PEDRO PABLO i/s/o UTI. Patient has been tolerating increased PO intake.  - Monitor I&Os  - Avoid nephrotoxins

## 2025-02-17 NOTE — PHYSICAL THERAPY INITIAL EVALUATION ADULT - DIAGNOSIS, PT EVAL
Ok for hub to relay.  Left vm for the pt to call and schedule an annual follow up with Elva Cheatham or Dr Hendrix.     generalized weakness

## 2025-02-17 NOTE — DISCHARGE NOTE PROVIDER - NSDCCPTREATMENT_GEN_ALL_CORE_FT
PRINCIPAL PROCEDURE  Procedure: CT abdomen pelvis  Findings and Treatment: PROCEDURE:  CT of the Abdomen and Pelvis was performed.  Sagittal and coronal reformats were performed.  FINDINGS:  LOWER CHEST: Trace bilateral pleural effusions and mild bibasilar   atelectasis (right greater than left). Thoracic aortic and coronary   artery atherosclerotic calcification.  LIVER: Within normal limits.  BILE DUCTS: Normal caliber.  GALLBLADDER: Within normal limits.  SPLEEN: Within normal limits.  PANCREAS: Within normal limits.  ADRENALS: Within normal limits.  KIDNEYS/URETERS: No right or left hydroureteronephrosis or obstructing   urinary tract calculus. Bilateral renal cysts and subcentimeter   low-attenuation lesions that are too small to characterize, largest cyst   arising from the lower pole of the right kidney measuring up to 8 cm.   Subcentimeter peripherally calcified cyst at the upper pole of the left   kidney.  BLADDER: Within normal limits.  REPRODUCTIVE ORGANS: Fibroid uterus. Adnexa are unremarkable.  BOWEL: No bowel obstruction. Appendix is normal. Colonic diverticulosis   without evidence of diverticulitis. Small bowel anastomosis in the mid   abdomen.  PERITONEUM/RETROPERITONEUM: Trace pelvic free fluid. No pneumoperitoneum.  VESSELS: Atherosclerotic calcifications of the aortoiliac tree. Normal   caliber abdominal aorta.  LYMPH NODES: No lymphadenopathy.  ABDOMINAL WALL: Postsurgical changes.  BONES: Degenerative changes of the spine. Prominent Schmorl's nodes along   the superior endplates of L3 and L4.  IMPRESSION:  No acute findings on unenhanced CT the abdomen and pelvis explain   patient's abdominal pain.  Colonic diverticulosis without evidence of diverticulitis.  Bilateral renal cysts. No right or left hydroureteronephrosis or   obstructing urinary tract calculus.  Fibroid uterus.

## 2025-02-17 NOTE — PHYSICAL THERAPY INITIAL EVALUATION ADULT - ADDITIONAL COMMENTS
Pt states she lives with her son in a house with 5 steps to enter and remains on the main level. Prior to admission, pt was ambulating independently without any assistive devices. Pt owns a rolling walker.   Post PT evaluation, pt left seated in chair, call bell and remote within reach, all precautions maintained, NAD. RN aware.

## 2025-02-17 NOTE — DISCHARGE NOTE PROVIDER - CARE PROVIDER_API CALL
[facility]  Emergency Medicine  [address] [place] [address]  Phone: [phone]  Fax: [phone]  Established Patient  Follow Up Time: 2 weeks

## 2025-02-17 NOTE — PHYSICAL THERAPY INITIAL EVALUATION ADULT - PERTINENT HX OF CURRENT PROBLEM, REHAB EVAL
89F with PMHx HTN, HLD, CAD s/p 1 stent (2010), and CKD4 who presents to the hospital with urinary complaints with fevers/chills and generalized abd pain/nausea, found to have sepsis 2/2 UTI. Blood cultures from 2/15 + E. Coli, UCx also with E coli. Started on IV CTX (2/15- ) pending culture sensitivities.

## 2025-02-17 NOTE — PROGRESS NOTE ADULT - PROBLEM SELECTOR PLAN 3
Thrombocytopenia to 82k, previously was 110s-130s in May 2021. -Likely has worsened thrombocytopenia 2/2 UTI + bacteremia. Improved today 2/16.    Plan  - Trend CBC  - Monitor for bleeding Thrombocytopenia to 82k, previously was 110s-130s in May 2021. -Likely has worsened thrombocytopenia 2/2 UTI + bacteremia. Improved today 2/16.  - Trend CBC  - Monitor for bleeding

## 2025-02-17 NOTE — PROGRESS NOTE ADULT - ASSESSMENT
This is a 89F with history of HTN, HLD, CAD s/p 1 stent (2010), and CKD4 who presents to the hospital with urinary complaints with fevers/chills and gen abd pain/nausea found to have sepsis 2/2 UTI. Blood cultures + for Gram Negative Rods and E. Coli. U Cx + for E coli. Ceftriaxone changed to 2g qd. 89F with PMHx HTN, HLD, CAD s/p 1 stent (2010), and CKD4 who presents to the hospital with urinary complaints with fevers/chills and generalized abd pain/nausea, found to have sepsis 2/2 UTI. Blood cultures from 2/15 + E. Coli, UCx also with E coli. Started on IV CTX (2/15- ), BCx sensitive to CTX. Repeat BCx obtained on 2/16, results pending. C/c/b mild PEDRO PABLO (Crt 2.16, baseline unclear), now downtrending s/p IVF.

## 2025-02-17 NOTE — DISCHARGE NOTE PROVIDER - NSDCCPCAREPLAN_GEN_ALL_CORE_FT
PRINCIPAL DISCHARGE DIAGNOSIS  Diagnosis: Acute UTI  Assessment and Plan of Treatment: You came to the hospital and were found to have a UTI. You were treated with IV antibiotics. You were found to have Bacteremia, and your IV antibiotics were changed to an increased strength.      SECONDARY DISCHARGE DIAGNOSES  Diagnosis: Sepsis, unspecified organism  Assessment and Plan of Treatment:      PRINCIPAL DISCHARGE DIAGNOSIS  Diagnosis: Bacteremia due to Escherichia coli  Assessment and Plan of Treatment: You came to the hospital with symptoms such as fever, chills, abdominal pain, and urinary issues, which suggested an infection. Blood and urine tests showed the presence of Escherichia coli (E. coli), a type of bacteria that commonly causes urinary tract infections (UTIs) and bloodstream infections. This is why you were experiencing the symptoms you reported.  To treat the infection, we initially administered an antibiotic called ceftriaxone. Once we confirmed the bacteria's sensitivity to this medication, we continued the treatment until it was safe to switch to an oral antibiotic, cefpodoxime, which you will continue taking at home twice daily through 2/24. It's important to finish the entire course of antibiotics to fully clear the infection.  If you begin to experience symptoms such as fever, chills, difficulties urinating, or new abdominal pain, please seek medical care immediately.  Please follow up with your primary care doctor to ensure the infection is fully resolved and to discuss any further management.      SECONDARY DISCHARGE DIAGNOSES  Diagnosis: Acute kidney injury superimposed on CKD  Assessment and Plan of Treatment: When you arrived, tests showed that your kidney function was not at its best, which is a condition known as acute kidney injury (PEDRO PABLO). This can happen with infections like the one you had, especially due to dehydration from symptoms such as fever and vomiting.  We treated you with intravenous fluids to help support your kidneys and improve your hydration. This helped your kidney function return closer to your previous levels, and your creatinine levels decreased during your stay, indicating that your kidneys were recovering.  Seek urgent medical care if you notice signs of worsening kidney function such as reduced urine output, swelling, or fatigue.

## 2025-02-17 NOTE — PROGRESS NOTE ADULT - PROBLEM SELECTOR PLAN 5
- On losartan 100mg daily, nifedipine 60mg daily, metoprolol 12.5mg BID.     Plan  - Hold losartan given likely PEDRO PABLO on CKD4  - Restart nifedipine and metop tart On home losartan 100mg daily, nifedipine 60mg daily, metoprolol 12.5mg BID.   - Hold losartan given likely PEDRO PABLO on CKD  - Restart home nifedipine and metoprolol tartrate

## 2025-02-17 NOTE — PROGRESS NOTE ADULT - PROBLEM SELECTOR PLAN 4
- No complaints of chest pain, palpitations, or dizziness/LOC currently, does report history of chronic intermittent palpitations  - EKG with PACs but no ischemic findings, troponin indeterminant at 46 but likely elevated 2/2 PEDRO PABLO on CKD    -> Given no chest pain or ischemic findings on EKG would hold off on cardiac monitoring Hx CAD s/p 1 stent in 2010.  No complaints of chest pain, palpitations, or dizziness/LOC currently, does report history of chronic intermittent palpitations.  EKG with PACs but no ischemic findings, troponin indeterminant at 46 but likely elevated 2/2 PEDRO PABLO on CKD  - Given no chest pain or ischemic findings on EKG will hold off on cardiac monitoring

## 2025-02-17 NOTE — DISCHARGE NOTE PROVIDER - NSDCMRMEDTOKEN_GEN_ALL_CORE_FT
aspirin 81 mg oral delayed release tablet: 1 tab(s) orally once a day  atorvastatin 20 mg oral tablet: 1 tab(s) orally once a day (at bedtime)  losartan 100 mg oral tablet: 1 tab(s) orally once a day  metoprolol tartrate 25 mg oral tablet: 0.5 tab(s) orally 2 times a day  NIFEdipine 60 mg oral tablet, extended release: 1 tab(s) orally once a day   aspirin 81 mg oral delayed release tablet: 1 tab(s) orally once a day  atorvastatin 20 mg oral tablet: 1 tab(s) orally once a day (at bedtime)  cefpodoxime 200 mg oral tablet: 1 tab(s) orally 2 times a day  losartan 100 mg oral tablet: 1 tab(s) orally once a day  metoprolol tartrate 25 mg oral tablet: 0.5 tab(s) orally 2 times a day  NIFEdipine 60 mg oral tablet, extended release: 1 tab(s) orally once a day   aspirin 81 mg oral delayed release tablet: 1 tab(s) orally once a day  atorvastatin 20 mg oral tablet: 1 tab(s) orally once a day (at bedtime)  cefpodoxime 200 mg oral tablet: 1 tab(s) orally 2 times a day  metoprolol tartrate 25 mg oral tablet: 0.5 tab(s) orally 2 times a day  NIFEdipine 60 mg oral tablet, extended release: 1 tab(s) orally once a day  Outpatient PT: R 26.2

## 2025-02-17 NOTE — PROGRESS NOTE ADULT - SUBJECTIVE AND OBJECTIVE BOX
****************************************************  Miriam Moreau MD | PGY-1 Internal Medicine | TEAMS  ****************************************************    PATIENT: ELVIRA PIEDRA, MRN: [id number]    CHIEF COMPLAINT: Patient is a 89y old  Female who presents with a chief complaint of Sepsis 2/2 UTI (16 Feb 2025 07:47)    INTERVAL HISTORY/OVERNIGHT EVENTS: No overnight events.     SUBJECTIVE: Patient seen and examined at bedside.    MEDICATIONS:  MEDICATIONS  (STANDING):  aspirin enteric coated 81 milliGRAM(s) Oral daily  atorvastatin 20 milliGRAM(s) Oral at bedtime  cefTRIAXone   IVPB 1000 milliGRAM(s) IV Intermittent every 24 hours  chlorhexidine 2% Cloths 1 Application(s) Topical <User Schedule>  influenza  Vaccine (HIGH DOSE) 0.5 milliLiter(s) IntraMuscular once  metoprolol tartrate 12.5 milliGRAM(s) Oral two times a day  NIFEdipine XL 60 milliGRAM(s) Oral daily  polyethylene glycol 3350 17 Gram(s) Oral daily  senna 2 Tablet(s) Oral at bedtime    MEDICATIONS  (PRN):  acetaminophen     Tablet .. 650 milliGRAM(s) Oral every 6 hours PRN Temp greater or equal to 38C (100.4F), Mild Pain (1 - 3)  aluminum hydroxide/magnesium hydroxide/simethicone Suspension 30 milliLiter(s) Oral every 4 hours PRN Dyspepsia  melatonin 3 milliGRAM(s) Oral at bedtime PRN Insomnia  ondansetron Injectable 4 milliGRAM(s) IV Push every 8 hours PRN Nausea and/or Vomiting    ALLERGIES: Allergies  No Known Allergies    OBJECTIVE:  ICU Vital Signs Last 24 Hrs  T(C): 37.3 (17 Feb 2025 05:28), Max: 37.3 (17 Feb 2025 05:28)  T(F): 99.1 (17 Feb 2025 05:28), Max: 99.1 (17 Feb 2025 05:28)  HR: 60 (17 Feb 2025 05:28) (60 - 77)  BP: 127/71 (17 Feb 2025 05:28) (127/71 - 140/75)  BP(mean): --  ABP: --  ABP(mean): --  RR: 17 (17 Feb 2025 05:28) (17 - 18)  SpO2: 97% (17 Feb 2025 05:28) (97% - 99%)    O2 Parameters below as of 17 Feb 2025 05:28  Patient On (Oxygen Delivery Method): room air    I&O's Summary    16 Feb 2025 07:01  -  17 Feb 2025 07:00  --------------------------------------------------------  IN: 500 mL / OUT: 0 mL / NET: 500 mL    PHYSICAL EXAMINATION:  General: Comfortable, no acute distress, cooperative with exam.  HEENT: EOMI, conjunctiva and sclera anicteric, not injected.  Respiratory: CTA b/l, normal respiratory effort, no coughing, wheezes, crackles, or rales.  CV: RRR, no murmurs, rubs or gallops. No JVD. Distal pulses intact.  Abdominal: Soft, nontender, nondistended; no rebound or guarding; +bowel sounds.  Neurology: AOx3, no focal neuro defects, GARCIA.  Extremities: No pitting edema, +peripheral pulses.    LABS:                        13.0   8.72  )-----------( 94       ( 16 Feb 2025 06:28 )             37.3     02-16    141  |  104  |  29[H]  ----------------------------<  80  4.1   |  22  |  1.85[H]    Ca    8.9      16 Feb 2025 06:28  Phos  3.1     02-16  Mg     2.10     02-16    TPro  6.8  /  Alb  3.4  /  TBili  0.5  /  DBili  x   /  AST  44[H]  /  ALT  17  /  AlkPhos  99  02-16    LIVER FUNCTIONS - ( 16 Feb 2025 06:28 )  Alb: 3.4 g/dL / Pro: 6.8 g/dL / ALK PHOS: 99 U/L / ALT: 17 U/L / AST: 44 U/L / GGT: x           Urinalysis Basic - ( 16 Feb 2025 06:28 )    Color: x / Appearance: x / SG: x / pH: x  Gluc: 80 mg/dL / Ketone: x  / Bili: x / Urobili: x   Blood: x / Protein: x / Nitrite: x   Leuk Esterase: x / RBC: x / WBC x   Sq Epi: x / Non Sq Epi: x / Bacteria: x ****************************************************  Miriam Moreau MD | PGY-1 Internal Medicine | TEAMS  ****************************************************    PATIENT: ELVIRA PIEDRA, MRN: [id number]    CHIEF COMPLAINT: Patient is a 89y old  Female who presents with a chief complaint of Sepsis 2/2 UTI (16 Feb 2025 07:47)    INTERVAL HISTORY/OVERNIGHT EVENTS: No overnight events.     SUBJECTIVE: Patient seen and examined at bedside. States she feels much better today and does not have fever or chills.    MEDICATIONS:  MEDICATIONS  (STANDING):  aspirin enteric coated 81 milliGRAM(s) Oral daily  atorvastatin 20 milliGRAM(s) Oral at bedtime  cefTRIAXone   IVPB 1000 milliGRAM(s) IV Intermittent every 24 hours  chlorhexidine 2% Cloths 1 Application(s) Topical <User Schedule>  influenza  Vaccine (HIGH DOSE) 0.5 milliLiter(s) IntraMuscular once  metoprolol tartrate 12.5 milliGRAM(s) Oral two times a day  NIFEdipine XL 60 milliGRAM(s) Oral daily  polyethylene glycol 3350 17 Gram(s) Oral daily  senna 2 Tablet(s) Oral at bedtime    MEDICATIONS  (PRN):  acetaminophen     Tablet .. 650 milliGRAM(s) Oral every 6 hours PRN Temp greater or equal to 38C (100.4F), Mild Pain (1 - 3)  aluminum hydroxide/magnesium hydroxide/simethicone Suspension 30 milliLiter(s) Oral every 4 hours PRN Dyspepsia  melatonin 3 milliGRAM(s) Oral at bedtime PRN Insomnia  ondansetron Injectable 4 milliGRAM(s) IV Push every 8 hours PRN Nausea and/or Vomiting    ALLERGIES: Allergies  No Known Allergies    OBJECTIVE:  ICU Vital Signs Last 24 Hrs  T(C): 37.3 (17 Feb 2025 05:28), Max: 37.3 (17 Feb 2025 05:28)  T(F): 99.1 (17 Feb 2025 05:28), Max: 99.1 (17 Feb 2025 05:28)  HR: 60 (17 Feb 2025 05:28) (60 - 77)  BP: 127/71 (17 Feb 2025 05:28) (127/71 - 140/75)  BP(mean): --  ABP: --  ABP(mean): --  RR: 17 (17 Feb 2025 05:28) (17 - 18)  SpO2: 97% (17 Feb 2025 05:28) (97% - 99%)    O2 Parameters below as of 17 Feb 2025 05:28  Patient On (Oxygen Delivery Method): room air    I&O's Summary    16 Feb 2025 07:01  -  17 Feb 2025 07:00  --------------------------------------------------------  IN: 500 mL / OUT: 0 mL / NET: 500 mL    PHYSICAL EXAMINATION:  General: Comfortable, no acute distress, cooperative with exam.  HEENT: EOMI, conjunctiva and sclera anicteric, not injected.  Respiratory: CTA b/l, normal respiratory effort.  CV: RRR, no murmurs, rubs or gallops.  Abdominal: Soft, nontender, nondistended.  Neurology: AOx3, no focal neuro defects, GARCIA.  Extremities: No pitting edema, +peripheral pulses.    LABS:                        13.0   8.72  )-----------( 94       ( 16 Feb 2025 06:28 )             37.3     02-16    141  |  104  |  29[H]  ----------------------------<  80  4.1   |  22  |  1.85[H]    Ca    8.9      16 Feb 2025 06:28  Phos  3.1     02-16  Mg     2.10     02-16    TPro  6.8  /  Alb  3.4  /  TBili  0.5  /  DBili  x   /  AST  44[H]  /  ALT  17  /  AlkPhos  99  02-16    LIVER FUNCTIONS - ( 16 Feb 2025 06:28 )  Alb: 3.4 g/dL / Pro: 6.8 g/dL / ALK PHOS: 99 U/L / ALT: 17 U/L / AST: 44 U/L / GGT: x           Urinalysis Basic - ( 16 Feb 2025 06:28 )    Color: x / Appearance: x / SG: x / pH: x  Gluc: 80 mg/dL / Ketone: x  / Bili: x / Urobili: x   Blood: x / Protein: x / Nitrite: x   Leuk Esterase: x / RBC: x / WBC x   Sq Epi: x / Non Sq Epi: x / Bacteria: x

## 2025-02-17 NOTE — DISCHARGE NOTE PROVIDER - NSDCFUADDAPPT_GEN_ALL_CORE_FT
APPTS ARE READY TO BE MADE: [X] YES    Best Family or Patient Contact (if needed):    Additional Information about above appointments (if needed):    1: Dr. Lopez, PCP  2:   3:     Other comments or requests:    APPTS ARE READY TO BE MADE: [X] YES    Best Family or Patient Contact (if needed):    Additional Information about above appointments (if needed):    1: Dr. Lopez, PCP  2: Dr. Ciarra Tang  3:     Other comments or requests:    APPTS ARE READY TO BE MADE: [X] YES    Best Family or Patient Contact (if needed):    Additional Information about above appointments (if needed):    1: Dr. Lopze, PCP  2: Dr. Ciarra Tang  3:     Other comments or requests:   Patient informed us they already have secured a follow up appointment which is not visible on [facility] and declined to provide appointment details. Patient advised they are scheduled with their PCP on 3/10 but did not provide further details.

## 2025-02-17 NOTE — PROGRESS NOTE ADULT - PROBLEM SELECTOR PLAN 1
Patient has UTI w/+ E Coli in Urine Cultures. Patient's symptoms are improving on Ceftriaxone 1g qd. However, Bcx positive for GNR and E Coli.     Plan  - Change Ceftriaxone dose to 2g qd i/s/o of Gram Negative Bactermia. Course likely 10-14 days  - Repeat Blood Cultures  - Trend WBC, fever curve, vitals  - Of note, CT A/P non-con with bilateral renal cysts, largest measuring 8cm, doubtful this is playing a role in her infection (especially as she has no CVA TTP) but can consider additional w/u if she does not improve Patient has UTI w/+ E Coli in Urine Cultures. Patient's symptoms are improving on Ceftriaxone 1g qd. However, Bcx positive for GNR and E Coli.   - CTX 2g qd (2/15- ) i/s/o E coli bacteremia, cultures sensitive to CTX. Course likely 10-14 days  - F/u repeat BCx on 2/16  - Trend WBC, fever curve, vitals  - Of note, CT A/P non-con with bilateral renal cysts, largest measuring 8cm, doubtful this is playing a role in her infection (especially as she has no CVA TTP) but can consider additional w/u if she does not improve

## 2025-02-17 NOTE — DISCHARGE NOTE PROVIDER - ATTENDING ATTESTATION STATEMENT
ARTIFICIAL TEARS to affected eye(s) as needed. I have personally seen and examined the patient. I have collaborated with and supervised the

## 2025-02-17 NOTE — DISCHARGE NOTE PROVIDER - HOSPITAL COURSE
HPI:  This is a 89F with history of HTN, HLD, CAD s/p 1 stent (2010), and CKD4 who presents to the hospital with complaints of fevers/chills, generalized abd pain, with associated dysuria and increased urinary frequency/urgency for the past few days. Also with associated nausea with dry heaves. No other acute complaints as per patient.     On arrival to the ED her vitals were T 103 oral -> 97.9, P 109, /71, RR 18, O2 sat 95% RA. Her lab work showed mild leukocytosis with bandemia, thrombocytopenia, elevated Cr, and a positive UA. Her lactate was elevated and resolved on repeat. Her imaging did not show acute findings. She was given NS 2L, ibuprofen 600mg PO x1, tylenol 650mg PO x1, zofran 4mg IV x1, and ceftriaxone 1g. She was admitted to medicine for further management.  (15 Feb 2025 08:20)    Hospital Course:  Upon admission, the patient was continued on Ceftriaxone 1g qd. Her SCr improved, and she was tolerating PO intake. Urine culture grew E. Coli and Blood culture grew E. Coli and Gram Negative Rods. The patient's ceftriaxone dose was changed from 1g qd to 2g qd given Gram Negative Bacteremia.       Important Medication Changes and Reason:    Active or Pending Issues Requiring Follow-up:  [ ] Gram Negative Bacteremia and UTI    Advanced Directives:   [ X] Full code  [ ] DNR  [ ] Hospice    Discharge Diagnoses:  Gram Negative Bacteremia and UTI         HPI:  This is a 89F with history of HTN, HLD, CAD s/p 1 stent (2010), and CKD4 who presents to the hospital with complaints of fevers/chills, generalized abd pain, with associated dysuria and increased urinary frequency/urgency for the past few days. Also with associated nausea with dry heaves. No other acute complaints as per patient.     On arrival to the ED her vitals were T 103 oral -> 97.9, P 109, /71, RR 18, O2 sat 95% RA. Her lab work showed mild leukocytosis with bandemia, thrombocytopenia, elevated Cr, and a positive UA. Her lactate was elevated and resolved on repeat. Her imaging did not show acute findings. She was given NS 2L, ibuprofen 600mg PO x1, tylenol 650mg PO x1, zofran 4mg IV x1, and ceftriaxone 1g. She was admitted to medicine for further management.  (15 Feb 2025 08:20)    Hospital Course:  The patient was admitted on 2/15 with a positive urinalysis and initiated on ceftriaxone while awaiting culture results. She also presented with mild acute kidney injury (PEDRO PABLO), with a creatinine level of 2.16, though the baseline creatinine was unclear, with a previous value of around 1.4 in May 2021. Blood cultures from 2/15 were positive for E. coli, urine cultures also with E. coli. Started on IV CTX (2/15- ), BCx sensitive to CTX. Repeat BCx obtained on 2/16 with NGTD. C/c/b mild PEDRO PABLO (Crt 2.16, baseline unclear), now downtrending s/p IVF.    Important Medication Changes and Reason:    Active or Pending Issues Requiring Follow-up:  [ ] Gram Negative Bacteremia and UTI    Advanced Directives:   [ X] Full code  [ ] DNR  [ ] Hospice    Discharge Diagnoses:  Gram Negative Bacteremia and UTI         HPI:  This is a 89F with history of HTN, HLD, CAD s/p 1 stent (2010), and CKD4 who presents to the hospital with complaints of fevers/chills, generalized abd pain, with associated dysuria and increased urinary frequency/urgency for the past few days. Also with associated nausea with dry heaves. No other acute complaints as per patient.     On arrival to the ED her vitals were T 103 oral -> 97.9, P 109, /71, RR 18, O2 sat 95% RA. Her lab work showed mild leukocytosis with bandemia, thrombocytopenia, elevated Cr, and a positive UA. Her lactate was elevated and resolved on repeat. Her imaging did not show acute findings. She was given NS 2L, ibuprofen 600mg PO x1, tylenol 650mg PO x1, zofran 4mg IV x1, and ceftriaxone 1g. She was admitted to medicine for further management. (15 Feb 2025 08:20)    Hospital Course:  The patient was admitted on 2/15 with a positive urinalysis and initiated on ceftriaxone while awaiting culture results. Blood cultures from 2/15 were positive for E. coli, urine cultures also with E. coli. BCx sensitive to CTX. Repeat BCx obtained on 2/16 with NGTD. The patient was discharged with PO cefpodoxime 200mg twice daily to be continued until 2/24.    Of note patient presented with a mild PEDRO PABLO (Crt 2.16, baseline unclear), downtrending s/p IVF, Crt now 1.36 on day of discharge.    Important Medication Changes and Reason:  -PO cefpodoxime 200mg twice daily to be continued until 2/24    Active or Pending Issues Requiring Follow-up:  -F/u with PCP 2 weeks after discharge    Advanced Directives:   [X] Full code  [ ] DNR  [ ] Hospice    Discharge Diagnoses:  #E coli bacteremia  #UTI  #PEDRO PABLO

## 2025-02-18 ENCOUNTER — TRANSCRIPTION ENCOUNTER (OUTPATIENT)
Age: 89
End: 2025-02-18

## 2025-02-18 VITALS
RESPIRATION RATE: 17 BRPM | TEMPERATURE: 98 F | OXYGEN SATURATION: 100 % | SYSTOLIC BLOOD PRESSURE: 124 MMHG | HEART RATE: 60 BPM | DIASTOLIC BLOOD PRESSURE: 62 MMHG

## 2025-02-18 LAB
ALBUMIN SERPL ELPH-MCNC: 2.9 G/DL — LOW (ref 3.3–5)
ALP SERPL-CCNC: 88 U/L — SIGNIFICANT CHANGE UP (ref 40–120)
ALT FLD-CCNC: 15 U/L — SIGNIFICANT CHANGE UP (ref 4–33)
ANION GAP SERPL CALC-SCNC: 12 MMOL/L — SIGNIFICANT CHANGE UP (ref 7–14)
AST SERPL-CCNC: 27 U/L — SIGNIFICANT CHANGE UP (ref 4–32)
BILIRUB SERPL-MCNC: 0.4 MG/DL — SIGNIFICANT CHANGE UP (ref 0.2–1.2)
BUN SERPL-MCNC: 21 MG/DL — SIGNIFICANT CHANGE UP (ref 7–23)
CALCIUM SERPL-MCNC: 8.8 MG/DL — SIGNIFICANT CHANGE UP (ref 8.4–10.5)
CHLORIDE SERPL-SCNC: 103 MMOL/L — SIGNIFICANT CHANGE UP (ref 98–107)
CO2 SERPL-SCNC: 21 MMOL/L — LOW (ref 22–31)
CREAT SERPL-MCNC: 1.36 MG/DL — HIGH (ref 0.5–1.3)
EGFR: 37 ML/MIN/1.73M2 — LOW
GLUCOSE SERPL-MCNC: 90 MG/DL — SIGNIFICANT CHANGE UP (ref 70–99)
HCT VFR BLD CALC: 33.9 % — LOW (ref 34.5–45)
HGB BLD-MCNC: 11.8 G/DL — SIGNIFICANT CHANGE UP (ref 11.5–15.5)
MAGNESIUM SERPL-MCNC: 1.9 MG/DL — SIGNIFICANT CHANGE UP (ref 1.6–2.6)
MCHC RBC-ENTMCNC: 27.8 PG — SIGNIFICANT CHANGE UP (ref 27–34)
MCHC RBC-ENTMCNC: 34.8 G/DL — SIGNIFICANT CHANGE UP (ref 32–36)
MCV RBC AUTO: 79.8 FL — LOW (ref 80–100)
NRBC # BLD AUTO: 0 K/UL — SIGNIFICANT CHANGE UP (ref 0–0)
NRBC # FLD: 0 K/UL — SIGNIFICANT CHANGE UP (ref 0–0)
NRBC BLD AUTO-RTO: 0 /100 WBCS — SIGNIFICANT CHANGE UP (ref 0–0)
PHOSPHATE SERPL-MCNC: 3.2 MG/DL — SIGNIFICANT CHANGE UP (ref 2.5–4.5)
PLATELET # BLD AUTO: 144 K/UL — LOW (ref 150–400)
POTASSIUM SERPL-MCNC: 3.6 MMOL/L — SIGNIFICANT CHANGE UP (ref 3.5–5.3)
POTASSIUM SERPL-SCNC: 3.6 MMOL/L — SIGNIFICANT CHANGE UP (ref 3.5–5.3)
PROT SERPL-MCNC: 6.2 G/DL — SIGNIFICANT CHANGE UP (ref 6–8.3)
RBC # BLD: 4.25 M/UL — SIGNIFICANT CHANGE UP (ref 3.8–5.2)
RBC # FLD: 15.5 % — HIGH (ref 10.3–14.5)
SODIUM SERPL-SCNC: 136 MMOL/L — SIGNIFICANT CHANGE UP (ref 135–145)
WBC # BLD: 4.32 K/UL — SIGNIFICANT CHANGE UP (ref 3.8–10.5)
WBC # FLD AUTO: 4.32 K/UL — SIGNIFICANT CHANGE UP (ref 3.8–10.5)

## 2025-02-18 PROCEDURE — 99239 HOSP IP/OBS DSCHRG MGMT >30: CPT | Mod: GC

## 2025-02-18 RX ORDER — CEFPODOXIME PROXETIL 200 MG/1
1 TABLET, FILM COATED ORAL
Qty: 14 | Refills: 0
Start: 2025-02-18 | End: 2025-02-24

## 2025-02-18 RX ORDER — LOSARTAN POTASSIUM 100 MG/1
1 TABLET, FILM COATED ORAL
Refills: 0 | DISCHARGE

## 2025-02-18 RX ADMIN — METOPROLOL SUCCINATE 12.5 MILLIGRAM(S): 50 TABLET, EXTENDED RELEASE ORAL at 05:52

## 2025-02-18 RX ADMIN — Medication 1 APPLICATION(S): at 05:55

## 2025-02-18 RX ADMIN — Medication 81 MILLIGRAM(S): at 12:19

## 2025-02-18 RX ADMIN — Medication 60 MILLIGRAM(S): at 05:53

## 2025-02-18 RX ADMIN — CEFTRIAXONE 100 MILLIGRAM(S): 500 INJECTION, POWDER, FOR SOLUTION INTRAMUSCULAR; INTRAVENOUS at 01:36

## 2025-02-18 NOTE — DISCHARGE NOTE NURSING/CASE MANAGEMENT/SOCIAL WORK - FINANCIAL ASSISTANCE
[facility] provides services at a reduced cost to those who are determined to be eligible through [facility]’s financial assistance program. Information regarding [facility]’s financial assistance program can be found by going to [email or web address] or by calling [phone].

## 2025-02-18 NOTE — PROGRESS NOTE ADULT - PROBLEM SELECTOR PLAN 4
Hx CAD s/p 1 stent in 2010.  No complaints of chest pain, palpitations, or dizziness/LOC currently, does report history of chronic intermittent palpitations.  EKG with PACs but no ischemic findings, troponin indeterminant at 46 but likely elevated 2/2 PEDRO PABLO on CKD  - Given no chest pain or ischemic findings on EKG will hold off on cardiac monitoring

## 2025-02-18 NOTE — PROGRESS NOTE ADULT - PROBLEM SELECTOR PLAN 1
Patient has UTI w/+ E Coli in Urine Cultures. Patient's symptoms are improving on Ceftriaxone 1g qd. However, Bcx positive for GNR and E Coli.   - CTX 2g qd (2/15- ) i/s/o E coli bacteremia, cultures sensitive to CTX. Course likely 10-14 days  - F/u repeat BCx on 2/16  - Trend WBC, fever curve, vitals  - Of note, CT A/P non-con with bilateral renal cysts, largest measuring 8cm, doubtful this is playing a role in her infection (especially as she has no CVA TTP) but can consider additional w/u if she does not improve Patient has UTI w/+ E Coli in Urine Cultures. Patient's symptoms are improving on Ceftriaxone 1g qd. However, Bcx positive for E Coli sensitive to CTX.  Repeat BCx from 2/16 with NGTD.  - CTX 2g qd (2/15- ) i/s/o E coli bacteremia, cultures sensitive to CTX. Course likely 10-14 days  - Discharge with PO cefpodoxime 200mg BID through 2/24  - Trend WBC, fever curve, vitals  - Of note, CT A/P non-con with bilateral renal cysts, largest measuring 8cm, doubtful this is playing a role in her infection (especially as she has no CVA TTP), f/u outpt

## 2025-02-18 NOTE — PROGRESS NOTE ADULT - PROBLEM SELECTOR PLAN 2
Reports history of CKD4, unclear recent Cr level. Prior Cr btwn 1.48-1.52 as of 5/2021.  Mild PEDRO PABLO on admission (Crt 2.16), now downtrending s/p IVF. Likely PEDRO PABLO i/s/o UTI. Patient has been tolerating increased PO intake.  - Monitor I&Os  - Avoid nephrotoxins

## 2025-02-18 NOTE — PROGRESS NOTE ADULT - PROBLEM SELECTOR PLAN 5
On home losartan 100mg daily, nifedipine 60mg daily, metoprolol 12.5mg BID.   - Hold losartan given likely PEDRO PABLO on CKD  - Restart home nifedipine and metoprolol tartrate

## 2025-02-18 NOTE — DISCHARGE NOTE NURSING/CASE MANAGEMENT/SOCIAL WORK - NSDCFUADDAPPT_GEN_ALL_CORE_FT
APPTS ARE READY TO BE MADE: [X] YES    Best Family or Patient Contact (if needed):    Additional Information about above appointments (if needed):    1: Dr. Lopez, PCP  2:   3:     Other comments or requests:

## 2025-02-18 NOTE — DISCHARGE NOTE NURSING/CASE MANAGEMENT/SOCIAL WORK - NSDCPEFALRISK_GEN_ALL_CORE
For information on Fall & Injury Prevention, visit: [email or web address] OR  [email or web address] OR  https://www.cdc.gov/steadi/patient.html

## 2025-02-18 NOTE — PROGRESS NOTE ADULT - SUBJECTIVE AND OBJECTIVE BOX
****************************************************  Miriam Moreau MD | PGY-1 Internal Medicine | TEAMS  ****************************************************    PATIENT: ELVIRA PIEDRA, MRN: [id number]    CHIEF COMPLAINT: Patient is a 89y old  Female who presents with a chief complaint of Sepsis 2/2 UTI (17 Feb 2025 20:20)    INTERVAL HISTORY/OVERNIGHT EVENTS: No overnight events.     SUBJECTIVE: Patient seen and examined at bedside.    MEDICATIONS:  MEDICATIONS  (STANDING):  aspirin enteric coated 81 milliGRAM(s) Oral daily  atorvastatin 20 milliGRAM(s) Oral at bedtime  cefTRIAXone   IVPB 1000 milliGRAM(s) IV Intermittent every 24 hours  influenza  Vaccine (HIGH DOSE) 0.5 milliLiter(s) IntraMuscular once  metoprolol tartrate 12.5 milliGRAM(s) Oral two times a day  NIFEdipine XL 60 milliGRAM(s) Oral daily  polyethylene glycol 3350 17 Gram(s) Oral daily  senna 2 Tablet(s) Oral at bedtime    MEDICATIONS  (PRN):  acetaminophen     Tablet .. 650 milliGRAM(s) Oral every 6 hours PRN Temp greater or equal to 38C (100.4F), Mild Pain (1 - 3)  aluminum hydroxide/magnesium hydroxide/simethicone Suspension 30 milliLiter(s) Oral every 4 hours PRN Dyspepsia  melatonin 3 milliGRAM(s) Oral at bedtime PRN Insomnia  ondansetron Injectable 4 milliGRAM(s) IV Push every 8 hours PRN Nausea and/or Vomiting    ALLERGIES: Allergies  No Known Allergies    OBJECTIVE:  ICU Vital Signs Last 24 Hrs  T(C): 36.9 (18 Feb 2025 05:20), Max: 37.2 (17 Feb 2025 21:58)  T(F): 98.5 (18 Feb 2025 05:20), Max: 99 (17 Feb 2025 21:58)  HR: 60 (18 Feb 2025 05:20) (60 - 73)  BP: 124/62 (18 Feb 2025 05:20) (124/59 - 135/69)  BP(mean): --  ABP: --  ABP(mean): --  RR: 17 (18 Feb 2025 05:20) (17 - 17)  SpO2: 100% (18 Feb 2025 05:20) (95% - 100%)    O2 Parameters below as of 18 Feb 2025 05:20  Patient On (Oxygen Delivery Method): room air    I&O's Summary    17 Feb 2025 07:01  -  18 Feb 2025 07:00  --------------------------------------------------------  IN: 50 mL / OUT: 400 mL / NET: -350 mL    PHYSICAL EXAMINATION:  General: Comfortable, no acute distress, cooperative with exam.  HEENT: EOMI, conjunctiva and sclera anicteric, not injected.  Respiratory: CTA b/l, normal respiratory effort.  CV: RRR, no murmurs, rubs or gallops.  Abdominal: Soft, nontender, nondistended.  Neurology: AOx3, no focal neuro defects, GARCIA.  Extremities: No pitting edema, +peripheral pulses.    LABS:                        11.5   5.78  )-----------( 107      ( 17 Feb 2025 06:00 )             32.7     02-17    140  |  106  |  25[H]  ----------------------------<  92  3.9   |  21[L]  |  1.57[H]    Ca    8.6      17 Feb 2025 06:00  Phos  3.1     02-17  Mg     2.00     02-17    TPro  6.2  /  Alb  3.0[L]  /  TBili  0.4  /  DBili  x   /  AST  31  /  ALT  15  /  AlkPhos  84  02-17    LIVER FUNCTIONS - ( 17 Feb 2025 06:00 )  Alb: 3.0 g/dL / Pro: 6.2 g/dL / ALK PHOS: 84 U/L / ALT: 15 U/L / AST: 31 U/L / GGT: x           Urinalysis Basic - ( 17 Feb 2025 06:00 )    Color: x / Appearance: x / SG: x / pH: x  Gluc: 92 mg/dL / Ketone: x  / Bili: x / Urobili: x   Blood: x / Protein: x / Nitrite: x   Leuk Esterase: x / RBC: x / WBC x   Sq Epi: x / Non Sq Epi: x / Bacteria: x ****************************************************  Miriam Moreau MD | PGY-1 Internal Medicine | TEAMS  ****************************************************    PATIENT: ELVIRA PIEDRA, MRN: [id number]    CHIEF COMPLAINT: Patient is a 89y old  Female who presents with a chief complaint of Sepsis 2/2 UTI (17 Feb 2025 20:20)    INTERVAL HISTORY/OVERNIGHT EVENTS: No overnight events.     SUBJECTIVE: Patient seen and examined at bedside. States she is feeling much better and has no more urinary symptoms, no pain anywhere. No acute concerns identified during conversation. Spoke with granddaughter on phone at bedside to update on progress and answer any questions.    MEDICATIONS:  MEDICATIONS  (STANDING):  aspirin enteric coated 81 milliGRAM(s) Oral daily  atorvastatin 20 milliGRAM(s) Oral at bedtime  cefTRIAXone   IVPB 1000 milliGRAM(s) IV Intermittent every 24 hours  influenza  Vaccine (HIGH DOSE) 0.5 milliLiter(s) IntraMuscular once  metoprolol tartrate 12.5 milliGRAM(s) Oral two times a day  NIFEdipine XL 60 milliGRAM(s) Oral daily  polyethylene glycol 3350 17 Gram(s) Oral daily  senna 2 Tablet(s) Oral at bedtime    MEDICATIONS  (PRN):  acetaminophen     Tablet .. 650 milliGRAM(s) Oral every 6 hours PRN Temp greater or equal to 38C (100.4F), Mild Pain (1 - 3)  aluminum hydroxide/magnesium hydroxide/simethicone Suspension 30 milliLiter(s) Oral every 4 hours PRN Dyspepsia  melatonin 3 milliGRAM(s) Oral at bedtime PRN Insomnia  ondansetron Injectable 4 milliGRAM(s) IV Push every 8 hours PRN Nausea and/or Vomiting    ALLERGIES: Allergies  No Known Allergies    OBJECTIVE:  ICU Vital Signs Last 24 Hrs  T(C): 36.9 (18 Feb 2025 05:20), Max: 37.2 (17 Feb 2025 21:58)  T(F): 98.5 (18 Feb 2025 05:20), Max: 99 (17 Feb 2025 21:58)  HR: 60 (18 Feb 2025 05:20) (60 - 73)  BP: 124/62 (18 Feb 2025 05:20) (124/59 - 135/69)  BP(mean): --  ABP: --  ABP(mean): --  RR: 17 (18 Feb 2025 05:20) (17 - 17)  SpO2: 100% (18 Feb 2025 05:20) (95% - 100%)    O2 Parameters below as of 18 Feb 2025 05:20  Patient On (Oxygen Delivery Method): room air    I&O's Summary    17 Feb 2025 07:01  -  18 Feb 2025 07:00  --------------------------------------------------------  IN: 50 mL / OUT: 400 mL / NET: -350 mL    PHYSICAL EXAMINATION:  General: Comfortable, no acute distress, cooperative with exam.  HEENT: EOMI, conjunctiva and sclera anicteric, not injected.  Respiratory: CTA b/l, normal respiratory effort.  CV: RRR, no murmurs, rubs or gallops.  Abdominal: Soft, nontender, nondistended.  Neurology: AOx3, no focal neuro defects, GARCIA.  Extremities: No pitting edema, +peripheral pulses.    LABS:                        11.5   5.78  )-----------( 107      ( 17 Feb 2025 06:00 )             32.7     02-17    140  |  106  |  25[H]  ----------------------------<  92  3.9   |  21[L]  |  1.57[H]    Ca    8.6      17 Feb 2025 06:00  Phos  3.1     02-17  Mg     2.00     02-17    TPro  6.2  /  Alb  3.0[L]  /  TBili  0.4  /  DBili  x   /  AST  31  /  ALT  15  /  AlkPhos  84  02-17    LIVER FUNCTIONS - ( 17 Feb 2025 06:00 )  Alb: 3.0 g/dL / Pro: 6.2 g/dL / ALK PHOS: 84 U/L / ALT: 15 U/L / AST: 31 U/L / GGT: x           Urinalysis Basic - ( 17 Feb 2025 06:00 )    Color: x / Appearance: x / SG: x / pH: x  Gluc: 92 mg/dL / Ketone: x  / Bili: x / Urobili: x   Blood: x / Protein: x / Nitrite: x   Leuk Esterase: x / RBC: x / WBC x   Sq Epi: x / Non Sq Epi: x / Bacteria: x

## 2025-02-18 NOTE — PROGRESS NOTE ADULT - PROBLEM SELECTOR PLAN 3
Thrombocytopenia to 82k, previously was 110s-130s in May 2021. -Likely has worsened thrombocytopenia 2/2 UTI + bacteremia. Improved today 2/16.  - Trend CBC  - Monitor for bleeding

## 2025-02-18 NOTE — PROGRESS NOTE ADULT - ATTENDING COMMENTS
Patient seen and examined by myself , H&P reviewed case discussed  with resident ,agree with the above finding and plan  89F with history of HTN, HLD, CAD s/p 1 stent (2010), and CKD4  presented  to the hospital with urinary complaints with fevers/chills and gen abd pain/nausea found to have sepsis 2/2 UTI.  Pt reports feeling better and did not have dysuria when she passed urine  will continue ceftriaxone   f/u blood and urine cx   PEDRO PABLO; Improving, will try to find out baseline Renal function   Rest of the management as above  Plan of care d/w pt and family at bedside
89F with hx of HTN, HLD, CAD s/p 1 stent (2010), and CKD4  admited with sepsis 2/2 UTI. UCX and BCX with E.coli bacteremia -pansensitive. clinically improved. repeat bcx neg x48hr. plan to transition to oral cefpodoxime to complete 10d course of abx.   stable for discharge to home.
Patient seen and examined by myself , H&P reviewed case discussed  with resident ,agree with the above finding and plan  89F with history of HTN, HLD, CAD s/p 1 stent (2010), and CKD4  presented  to the hospital with urinary complaints with fevers/chills and gen abd pain/nausea found to have sepsis 2/2 UTI.  Pt reports feeling better , pt say she was having chills before coming to the hospital, now resolved   Gram negative bacteremia - Blood and urine growing E coli , F/u sensitivity and repeat blood cx   will continue ceftriaxone as pt responding clinically   PEDRO PABLO; Improving, will try to find out baseline Renal function   Thrombocytopenia : platelets stable , will CTM   PT eval   Rest of the management as above  Plan of care d/w pt at bedside
Patient seen and examined by myself , H&P reviewed case discussed  with resident ,agree with the above finding and plan  89F with history of HTN, HLD, CAD s/p 1 stent (2010), and CKD4  presented  to the hospital with urinary complaints with fevers/chills and gen abd pain/nausea found to have sepsis 2/2 UTI.  Pt reports feeling better , pt say she was having chills before coming to the hospital, now resolved   Gram negative bacteremia - Blood and urine growing E coli ,  sensitive to Ctx  , f//u repeat blood cx   will continue ceftriaxone , changed dose for bacteremia   PEDRO PABLO; Improving, will try to find out baseline Renal function   Thrombocytopenia : platelets stable , will CTM   PT eval   Rest of the management as above  Plan of care d/w pt at bedside along wit resident

## 2025-02-18 NOTE — DISCHARGE NOTE NURSING/CASE MANAGEMENT/SOCIAL WORK - PATIENT PORTAL LINK FT
You can access the FollowMyHealth Patient Portal offered by [facility] by registering at the following website: [email or web address]. By joining [employer]’s FollowMyHealth portal, you will also be able to view your health information using other applications (apps) compatible with our system.

## 2025-02-18 NOTE — PROGRESS NOTE ADULT - ASSESSMENT
89F with PMHx HTN, HLD, CAD s/p 1 stent (2010), and CKD4 who presents to the hospital with urinary complaints with fevers/chills and generalized abd pain/nausea, found to have sepsis 2/2 UTI. Blood cultures from 2/15 + E. Coli, UCx also with E coli. Started on IV CTX (2/15- ), BCx sensitive to CTX. Repeat BCx obtained on 2/16, results pending. C/c/b mild PEDRO PABLO (Crt 2.16, baseline unclear), now downtrending s/p IVF. 89F with PMHx HTN, HLD, CAD s/p 1 stent (2010), and CKD4 who presents to the hospital with urinary complaints with fevers/chills and generalized abd pain/nausea, found to have sepsis 2/2 UTI. Blood cultures from 2/15 + E. Coli, UCx also with E coli. Started on IV CTX (2/15- ), BCx sensitive to CTX. Repeat BCx obtained on 2/16 with NGTD. C/c/b mild PEDRO PABLO (Crt 2.16, baseline unclear), now downtrending s/p IVF.

## 2025-02-18 NOTE — PROGRESS NOTE ADULT - PROBLEM SELECTOR PLAN 7
DVT ppx - SCDs  Diet - DASH regular diet  Activity - OOB with assistance, increase as tolerated    Fall and aspiration precautions DVT ppx - SCDs  Diet - DASH regular diet  Activity - OOB with assistance, increase as tolerated  Dispo - home with outpatient PT    Fall and aspiration precautions

## 2025-04-08 ENCOUNTER — EMERGENCY (EMERGENCY)
Facility: HOSPITAL | Age: 89
LOS: 0 days | Discharge: HOME | End: 2025-04-09
Attending: EMERGENCY MEDICINE
Payer: MEDICARE

## 2025-04-08 VITALS
HEART RATE: 101 BPM | OXYGEN SATURATION: 99 % | DIASTOLIC BLOOD PRESSURE: 93 MMHG | SYSTOLIC BLOOD PRESSURE: 188 MMHG | WEIGHT: 147.05 LBS | HEIGHT: 64 IN | RESPIRATION RATE: 17 BRPM | TEMPERATURE: 98 F

## 2025-04-08 DIAGNOSIS — Z87.19 PERSONAL HISTORY OF OTHER DISEASES OF THE DIGESTIVE SYSTEM: Chronic | ICD-10-CM

## 2025-04-08 DIAGNOSIS — N39.0 URINARY TRACT INFECTION, SITE NOT SPECIFIED: ICD-10-CM

## 2025-04-08 DIAGNOSIS — R10.84 GENERALIZED ABDOMINAL PAIN: ICD-10-CM

## 2025-04-08 DIAGNOSIS — I10 ESSENTIAL (PRIMARY) HYPERTENSION: ICD-10-CM

## 2025-04-08 LAB
ALBUMIN SERPL ELPH-MCNC: 3.2 G/DL — LOW (ref 3.3–5)
ALP SERPL-CCNC: 134 U/L — HIGH (ref 40–120)
ALT FLD-CCNC: 14 U/L — SIGNIFICANT CHANGE UP (ref 12–78)
ANION GAP SERPL CALC-SCNC: 9 MMOL/L — SIGNIFICANT CHANGE UP (ref 5–17)
AST SERPL-CCNC: 23 U/L — SIGNIFICANT CHANGE UP (ref 15–37)
BASOPHILS # BLD AUTO: 0.03 K/UL — SIGNIFICANT CHANGE UP (ref 0–0.2)
BASOPHILS NFR BLD AUTO: 0.3 % — SIGNIFICANT CHANGE UP (ref 0–2)
BILIRUB SERPL-MCNC: 0.6 MG/DL — SIGNIFICANT CHANGE UP (ref 0.2–1.2)
BLD GP AB SCN SERPL QL: SIGNIFICANT CHANGE UP
BUN SERPL-MCNC: 19 MG/DL — SIGNIFICANT CHANGE UP (ref 7–23)
CALCIUM SERPL-MCNC: 10 MG/DL — SIGNIFICANT CHANGE UP (ref 8.5–10.1)
CHLORIDE SERPL-SCNC: 104 MMOL/L — SIGNIFICANT CHANGE UP (ref 96–108)
CO2 SERPL-SCNC: 25 MMOL/L — SIGNIFICANT CHANGE UP (ref 22–31)
CREAT SERPL-MCNC: 1.5 MG/DL — HIGH (ref 0.5–1.3)
EGFR: 32 ML/MIN/1.73M2 — LOW
EGFR: 32 ML/MIN/1.73M2 — LOW
EOSINOPHIL # BLD AUTO: 0.01 K/UL — SIGNIFICANT CHANGE UP (ref 0–0.5)
EOSINOPHIL NFR BLD AUTO: 0.1 % — SIGNIFICANT CHANGE UP (ref 0–6)
GLUCOSE SERPL-MCNC: 123 MG/DL — HIGH (ref 70–99)
HCT VFR BLD CALC: 40.2 % — SIGNIFICANT CHANGE UP (ref 34.5–45)
HGB BLD-MCNC: 14.1 G/DL — SIGNIFICANT CHANGE UP (ref 11.5–15.5)
IMM GRANULOCYTES NFR BLD AUTO: 0.4 % — SIGNIFICANT CHANGE UP (ref 0–0.9)
LACTATE SERPL-SCNC: 1.2 MMOL/L — SIGNIFICANT CHANGE UP (ref 0.7–2)
LIDOCAIN IGE QN: 40 U/L — SIGNIFICANT CHANGE UP (ref 13–75)
LYMPHOCYTES # BLD AUTO: 0.83 K/UL — LOW (ref 1–3.3)
LYMPHOCYTES # BLD AUTO: 8.1 % — LOW (ref 13–44)
MCHC RBC-ENTMCNC: 27.5 PG — SIGNIFICANT CHANGE UP (ref 27–34)
MCHC RBC-ENTMCNC: 35.1 G/DL — SIGNIFICANT CHANGE UP (ref 32–36)
MCV RBC AUTO: 78.4 FL — LOW (ref 80–100)
MONOCYTES # BLD AUTO: 0.84 K/UL — SIGNIFICANT CHANGE UP (ref 0–0.9)
MONOCYTES NFR BLD AUTO: 8.2 % — SIGNIFICANT CHANGE UP (ref 2–14)
NEUTROPHILS # BLD AUTO: 8.53 K/UL — HIGH (ref 1.8–7.4)
NEUTROPHILS NFR BLD AUTO: 82.9 % — HIGH (ref 43–77)
NRBC BLD AUTO-RTO: 0 /100 WBCS — SIGNIFICANT CHANGE UP (ref 0–0)
PLATELET # BLD AUTO: 230 K/UL — SIGNIFICANT CHANGE UP (ref 150–400)
POTASSIUM SERPL-MCNC: 3.8 MMOL/L — SIGNIFICANT CHANGE UP (ref 3.5–5.3)
POTASSIUM SERPL-SCNC: 3.8 MMOL/L — SIGNIFICANT CHANGE UP (ref 3.5–5.3)
PROT SERPL-MCNC: 8.9 GM/DL — HIGH (ref 6–8.3)
RBC # BLD: 5.13 M/UL — SIGNIFICANT CHANGE UP (ref 3.8–5.2)
RBC # FLD: 15.6 % — HIGH (ref 10.3–14.5)
SODIUM SERPL-SCNC: 138 MMOL/L — SIGNIFICANT CHANGE UP (ref 135–145)
TROPONIN I, HIGH SENSITIVITY RESULT: 20.5 NG/L — SIGNIFICANT CHANGE UP
WBC # BLD: 10.28 K/UL — SIGNIFICANT CHANGE UP (ref 3.8–10.5)
WBC # FLD AUTO: 10.28 K/UL — SIGNIFICANT CHANGE UP (ref 3.8–10.5)

## 2025-04-08 PROCEDURE — 71045 X-RAY EXAM CHEST 1 VIEW: CPT | Mod: 26

## 2025-04-08 PROCEDURE — 99285 EMERGENCY DEPT VISIT HI MDM: CPT

## 2025-04-08 PROCEDURE — 93010 ELECTROCARDIOGRAM REPORT: CPT

## 2025-04-08 RX ORDER — ACETAMINOPHEN 500 MG/5ML
1000 LIQUID (ML) ORAL ONCE
Refills: 0 | Status: COMPLETED | OUTPATIENT
Start: 2025-04-08 | End: 2025-04-08

## 2025-04-08 RX ORDER — IOHEXOL 350 MG/ML
30 INJECTION, SOLUTION INTRAVENOUS ONCE
Refills: 0 | Status: COMPLETED | OUTPATIENT
Start: 2025-04-08 | End: 2025-04-09

## 2025-04-08 RX ADMIN — Medication 1000 MILLILITER(S): at 22:56

## 2025-04-08 RX ADMIN — Medication 400 MILLIGRAM(S): at 22:54

## 2025-04-08 RX ADMIN — Medication 2 MILLIGRAM(S): at 22:54

## 2025-04-08 NOTE — ED ADULT TRIAGE NOTE - NS ED TRIAGE AVPU SCALE
14-Mar-2025 09:04 14-Mar-2025 20:47 14-Mar-2025 16:39 15-Mar-2025 00:38 15-Mar-2025 09:47 13-Mar-2025 21:52 15-Mar-2025 13:10 16-Mar-2025 09:02 14-Mar-2025 05:27 14-Mar-2025 12:48 15-Mar-2025 15:54 15-Mar-2025 21:06 16-Mar-2025 01:06 14-Mar-2025 01:13 16-Mar-2025 05:14 15-Mar-2025 11:20 Alert-The patient is alert, awake and responds to voice. The patient is oriented to time, place, and person. The triage nurse is able to obtain subjective information.

## 2025-04-08 NOTE — ED PROVIDER NOTE - INTERPRETATION
EKG performed in ED, NSR at 86, +subtle STD of anterior leads, normal intervals/normal sinus rhythm, Normal axis, Normal [place] interval and QRS complex. There are no acute ischemic ST or T-wave changes.

## 2025-04-08 NOTE — ED PROVIDER NOTE - CARE PLAN
Principal Discharge DX:	Abdominal pain  Secondary Diagnosis:	Hypertensive emergency   1 Principal Discharge DX:	Abdominal pain  Secondary Diagnosis:	Hypertensive emergency  Secondary Diagnosis:	Acute UTI

## 2025-04-08 NOTE — ED PROVIDER NOTE - CARE PROVIDER_API CALL
Dominick Kebede  Internal Medicine  [address] [place] [address]  Phone: [phone]  Fax: [phone]  Follow Up Time: Urgent

## 2025-04-08 NOTE — ED ADULT NURSE NOTE - NSFALLRISKINTERV_ED_ALL_ED

## 2025-04-08 NOTE — ED PROVIDER NOTE - CLINICAL SUMMARY MEDICAL DECISION MAKING FREE TEXT BOX
89 yo F with abd pain only, concerning for obstruction, pancreatitis, ischemia less likely , also hypertensive  -cbc, cmp, type and screen, coags, lactate, lipase, coags, trop, ua/rflx  -ct ab/pel, cxr, ekg, iv tylenol/morphine for pain, hydrlazine for pressure, npo, hydraiton  -f/u results, reeval

## 2025-04-08 NOTE — ED PROVIDER NOTE - OBJECTIVE STATEMENT
89 yo F with abd pain for 1 day,  diffuse, generalized, worse with movement, no radiation.  Came on for no reason.  No other complaints or associated symptoms.  last BM was normal today.    ROS: negative for fever, cough, headache, chest pain, shortness of breath, nausea, vomiting, diarrhea, rash, paresthesia, and weakness--all other systems reviewed are negative.   PMH: HTN, HLD, CAD s/p 1 stent (2010), and CKD4; Meds: See EMR for list; SH: Denies smoking/drinking/drug use

## 2025-04-08 NOTE — ED PROVIDER NOTE - PHYSICAL EXAMINATION
Vitals: HTN at 188/93  Gen: AAOx3, NAD, sitting uncomfortably in stretcher  Head: ncat, perrla, eomi b/l  Neck: supple, no lymphadenopathy, no midline deviation  Heart: rrr, no m/r/g  Lungs: CTA b/l, no rales/ronchi/wheezes  Abd: soft, nontender, non-distended, no rebound or guarding  Ext: no clubbing/cyanosis/edema  Neuro: sensation and muscle strength intact b/l

## 2025-04-08 NOTE — ED PROVIDER NOTE - PROGRESS NOTE DETAILS
pt. had a seizure like episode lasting seconds, urinated on self in ER, now drowsy after, never had a seizure before, lasted less than a minute Results reported to patient--grossly benign, no obstruction on imaging, labs unremarkable with exception of UTI   Pt. reports feeling better after meds, pt. thinks the syncopal event earlier was from the medication that was given  IV.    pt. agrees to f/u with primary care outpt.  pt. understands to return to ED if symptoms worsen; will d/c with results for reference

## 2025-04-08 NOTE — ED ADULT NURSE NOTE - NS ED NURSE IV DC DT
Discharge instructions have been discussed with the patient and he verbalizes an understanding of the discharge teaching and follow up instructions. All questions and concerns have been addressed at this time. Patient has been encouraged to call the hospital or his doctor's office if any further questions or concerns arise. IV removed by Tobi MERRITT in good condition. Tele monitor removed by Tobi MERRITT in good condition. All belongings are with the patient upon leaving the unit. Patient off unit via wheelchair in good condition.    09-Apr-2025 06:55

## 2025-04-08 NOTE — ED PROVIDER NOTE - PATIENT PORTAL LINK FT
You can access the FollowMyHealth Patient Portal offered by [facility] by registering at the following website: [email or web address]. By joining [email or web address]’s FollowMyHealth portal, you will also be able to view your health information using other applications (apps) compatible with our system.

## 2025-04-09 VITALS
SYSTOLIC BLOOD PRESSURE: 143 MMHG | HEART RATE: 73 BPM | DIASTOLIC BLOOD PRESSURE: 79 MMHG | RESPIRATION RATE: 20 BRPM | OXYGEN SATURATION: 97 %

## 2025-04-09 PROBLEM — N18.4 CHRONIC KIDNEY DISEASE, STAGE 4 (SEVERE): Chronic | Status: ACTIVE | Noted: 2025-02-15

## 2025-04-09 LAB
APPEARANCE UR: CLEAR — SIGNIFICANT CHANGE UP
APTT BLD: 22.5 SEC — LOW (ref 24.5–35.6)
BACTERIA # UR AUTO: ABNORMAL /HPF
BILIRUB UR-MCNC: NEGATIVE — SIGNIFICANT CHANGE UP
COLOR SPEC: YELLOW — SIGNIFICANT CHANGE UP
DIFF PNL FLD: NEGATIVE — SIGNIFICANT CHANGE UP
EPI CELLS # UR: PRESENT
GLUCOSE UR QL: NEGATIVE MG/DL — SIGNIFICANT CHANGE UP
INR BLD: 1.13 RATIO — SIGNIFICANT CHANGE UP (ref 0.85–1.16)
KETONES UR-MCNC: NEGATIVE MG/DL — SIGNIFICANT CHANGE UP
LEUKOCYTE ESTERASE UR-ACNC: ABNORMAL
NITRITE UR-MCNC: NEGATIVE — SIGNIFICANT CHANGE UP
PH UR: 6 — SIGNIFICANT CHANGE UP (ref 5–8)
PROT UR-MCNC: NEGATIVE MG/DL — SIGNIFICANT CHANGE UP
PROTHROM AB SERPL-ACNC: 12.8 SEC — SIGNIFICANT CHANGE UP (ref 9.9–13.4)
RBC CASTS # UR COMP ASSIST: 2 /HPF — SIGNIFICANT CHANGE UP (ref 0–4)
SP GR SPEC: 1.01 — SIGNIFICANT CHANGE UP (ref 1–1.03)
UROBILINOGEN FLD QL: 0.2 MG/DL — SIGNIFICANT CHANGE UP (ref 0.2–1)
WBC UR QL: 10 /HPF — HIGH (ref 0–5)

## 2025-04-09 PROCEDURE — 74176 CT ABD & PELVIS W/O CONTRAST: CPT | Mod: 26

## 2025-04-09 PROCEDURE — 70450 CT HEAD/BRAIN W/O DYE: CPT | Mod: 26

## 2025-04-09 RX ORDER — SULFAMETHOXAZOLE/TRIMETHOPRIM 800-160 MG
1 TABLET ORAL ONCE
Refills: 0 | Status: COMPLETED | OUTPATIENT
Start: 2025-04-09 | End: 2025-04-09

## 2025-04-09 RX ORDER — METOCLOPRAMIDE HCL 10 MG
10 TABLET ORAL ONCE
Refills: 0 | Status: COMPLETED | OUTPATIENT
Start: 2025-04-09 | End: 2025-04-09

## 2025-04-09 RX ORDER — SULFAMETHOXAZOLE/TRIMETHOPRIM 800-160 MG
1 TABLET ORAL
Qty: 14 | Refills: 0
Start: 2025-04-09 | End: 2025-04-15

## 2025-04-09 RX ADMIN — Medication 10 MILLIGRAM(S): at 00:17

## 2025-04-09 RX ADMIN — IOHEXOL 30 MILLILITER(S): 350 INJECTION, SOLUTION INTRAVENOUS at 01:07

## 2025-04-09 RX ADMIN — Medication 1 TABLET(S): at 06:42

## 2025-04-09 RX ADMIN — Medication 10 MILLIGRAM(S): at 01:07

## 2025-04-09 NOTE — ED ADULT NURSE REASSESSMENT NOTE - NS ED NURSE REASSESS COMMENT FT1
Patient stated she felt "funny" then had a syncopal episode that last under 1 minute and the patient voided on herself. Denies pain.

## 2025-04-12 RX ORDER — CEFPODOXIME PROXETIL 200 MG/1
1 TABLET, FILM COATED ORAL
Qty: 14 | Refills: 0
Start: 2025-04-12 | End: 2025-04-18

## 2025-05-30 NOTE — PATIENT PROFILE ADULT - OVER THE PAST TWO WEEKS HAVE YOU FELT DOWN, DEPRESSED OR HOPELESS?
JANINE sent clinical information to [facility] email [email or web address]      no